# Patient Record
Sex: FEMALE | Race: WHITE | NOT HISPANIC OR LATINO | Employment: FULL TIME | URBAN - NONMETROPOLITAN AREA
[De-identification: names, ages, dates, MRNs, and addresses within clinical notes are randomized per-mention and may not be internally consistent; named-entity substitution may affect disease eponyms.]

---

## 2017-01-06 DIAGNOSIS — R53.81 MALAISE: ICD-10-CM

## 2017-01-06 DIAGNOSIS — R53.83 FATIGUE, UNSPECIFIED TYPE: ICD-10-CM

## 2017-01-06 DIAGNOSIS — E78.5 HYPERLIPIDEMIA, UNSPECIFIED HYPERLIPIDEMIA TYPE: Primary | ICD-10-CM

## 2017-01-25 ENCOUNTER — OFFICE VISIT (OUTPATIENT)
Dept: FAMILY MEDICINE CLINIC | Facility: CLINIC | Age: 57
End: 2017-01-25

## 2017-01-25 VITALS
HEART RATE: 62 BPM | DIASTOLIC BLOOD PRESSURE: 84 MMHG | BODY MASS INDEX: 32.44 KG/M2 | WEIGHT: 190 LBS | SYSTOLIC BLOOD PRESSURE: 124 MMHG | HEIGHT: 64 IN

## 2017-01-25 DIAGNOSIS — G43.909 MIGRAINE WITHOUT STATUS MIGRAINOSUS, NOT INTRACTABLE, UNSPECIFIED MIGRAINE TYPE: ICD-10-CM

## 2017-01-25 DIAGNOSIS — R00.2 PALPITATIONS: ICD-10-CM

## 2017-01-25 DIAGNOSIS — K21.9 GASTROESOPHAGEAL REFLUX DISEASE, ESOPHAGITIS PRESENCE NOT SPECIFIED: ICD-10-CM

## 2017-01-25 DIAGNOSIS — R55 POSTURAL DIZZINESS WITH PRESYNCOPE: Primary | ICD-10-CM

## 2017-01-25 DIAGNOSIS — R42 POSTURAL DIZZINESS WITH PRESYNCOPE: Primary | ICD-10-CM

## 2017-01-25 DIAGNOSIS — E55.9 VITAMIN D DEFICIENCY: ICD-10-CM

## 2017-01-25 DIAGNOSIS — E78.5 HYPERLIPIDEMIA, UNSPECIFIED HYPERLIPIDEMIA TYPE: ICD-10-CM

## 2017-01-25 PROCEDURE — 99214 OFFICE O/P EST MOD 30 MIN: CPT | Performed by: INTERNAL MEDICINE

## 2017-01-25 RX ORDER — SUMATRIPTAN 100 MG/1
TABLET, FILM COATED ORAL
Qty: 27 TABLET | Refills: 3 | Status: SHIPPED | OUTPATIENT
Start: 2017-01-25 | End: 2017-02-09 | Stop reason: SDUPTHER

## 2017-01-25 RX ORDER — OMEPRAZOLE 20 MG/1
20 CAPSULE, DELAYED RELEASE ORAL DAILY
Qty: 90 CAPSULE | Refills: 3 | Status: SHIPPED | OUTPATIENT
Start: 2017-01-25 | End: 2017-01-27 | Stop reason: SDUPTHER

## 2017-01-25 NOTE — MR AVS SNAPSHOT
Alexa Luna   1/25/2017 3:00 PM   Office Visit    Dept Phone:  [phone]   Encounter #:  [id number]    Provider:  Scot Abbasi MD   Department:  [facility]                Your Full Care Plan              Today's Medication Changes          These changes are accurate as of: 1/25/17  4:05 PM.  If you have any questions, ask your nurse or doctor.               Medication(s)that have changed:     SUMAtriptan 100 MG tablet   Commonly known as:  IMITREX   1/2-1 tablet by mouth. may repeat after 1 to 2 hours if needed maximum daily dose of 2 OR 3 TABLETS PER WEEK   What changed:  additional instructions   Changed by:  Scot Abbasi MD            Where to Get Your Medications      These medications were sent to [address] - [phone]  - [phone] [address] [address]     Phone:  [phone]     metoprolol tartrate 25 MG tablet    omeprazole 20 MG capsule    SUMAtriptan 100 MG tablet                  Your Updated Medication List          This list is accurate as of: 1/25/17  4:05 PM.  Always use your most recent med list.                estradiol 0.05 MG/24HR patch   Commonly known as:  PETE DICKEY       fluticasone 50 MCG/ACT nasal spray   Commonly known as:  FLONASE   1 spray into each nostril 2 (Two) Times a Day. Administer 1 spray in each nostril twice daily.       HYDROcodone-acetaminophen 7.5-325 MG per tablet   Commonly known as:  NORCO       meloxicam 15 MG tablet   Commonly known as:  MOBIC       metoprolol tartrate 25 MG tablet   Commonly known as:  LOPRESSOR   take 1 1/2 tablet by mouth twice a day       omeprazole 20 MG capsule   Commonly known as:  priLOSEC   Take 1 capsule by mouth Daily.       phenylephrine 10 MG tablet   Commonly known as:  SUDAFED PE   1 po 2-3 times daily prn congestion       SUMAtriptan 100 MG tablet   Commonly known  as:  IMITREX   1/2-1 tablet by mouth. may repeat after 1 to 2 hours if needed maximum daily dose of 2 OR 3 TABLETS PER WEEK       tiZANidine 4 MG tablet   Commonly known as:  ZANAFLEX       triamcinolone 0.1 % cream   Commonly known as:  KENALOG   Apply  topically 3 (Three) Times a Day. For itching       zolpidem 10 MG tablet   Commonly known as:  AMBIEN   Take 1 tablet by mouth At Night As Needed for sleep. 1/2 to 1 by mouth every night as needed for sleep.               We Performed the Following     Duplex Carotid Ultrasound CAR       You Were Diagnosed With        Codes Comments    Postural dizziness with presyncope    -  Primary ICD-10-CM: R42, R55  ICD-9-CM: 780.4, 780.2       Instructions     None    Patient Instructions History      Upcoming Appointments     Visit Type Date Time Department    OFFICE VISIT 2017  3:00 PM [facility]      [employer] Signup     [facility] [employer] allows you to send messages to your doctor, view your test results, renew your prescriptions, schedule appointments, and more. To sign up, go to [employer] and click on the Sign Up Now link in the New User? box. Enter your [employer] Activation Code exactly as it appears below along with the last four digits of your Social Security Number and your Date of Birth () to complete the sign-up process. If you do not sign up before the expiration date, you must request a new code.    [employer] Activation Code: [email or web address]  Expires: 2017  4:04 PM    If you have questions, you can email [email or web address] or call [phone] to talk to our [employer] staff. Remember, [employer] is NOT to be used for urgent needs. For medical emergencies, dial 911.               Other Info from Your Visit           Your Appointments     2018  3:30 PM CST   Office Visit with Scot Abbasi MD   [facility] [place] (--)    [address] [place] [address] [address] [place] [address]   [phone]            "Arrive 15 minutes prior to appointment.              Allergies     [place]  Swelling    Of face      Reason for Visit     Follow-up 12 month    Results lab      Vital Signs     Blood Pressure Pulse Height Weight Body Mass Index Smoking Status    124/84 62 64\" (162.6 cm) 190 lb (86.2 kg) 32.61 kg/m2 Former Smoker      Problems and Diagnoses Noted     Postural dizziness with presyncope    -  Primary        "

## 2017-01-25 NOTE — PROGRESS NOTES
"Subjective      History of Present Illness     Alexa Luna is a 56 y.o. female for annual follow up on hyperlipidemia, GERD, impaired fasting glucose, eustachian tube dysfunction, and ELVA among other issues.  She reports her last repeat colonoscopy was completed at the [facility] 10/2016 with no polyps found.  Repeat recommended in ten years, making her due fall of 2026.      She reports intermittent episodes of dizziness/presyncopal episodes occurring about once weekly.  She reports the episodes normally resolve spontaneously after about five minutes.  She denies any chest pain or pressure with the dizziness.  She denies a strong family history of stroke or CAD.   She denies any syncopal episodes, but describes dizziness consistent with presyncope.  She reports an episode a couple of months ago when she was walking across the lobby at work, when she actually had to grab a chair for support.  There is no pattern to timing with the episodes occurring both day and night.  We discussed options including a cardiac workup with a Holter monitor and/or a referral to neurology.  She has a history of atypical migraine headaches, for which she has taken Topamax, but was intolerant due to vision changes.  She was seen by optomology last week for partial vision loss and was referred to Dr. Aj, ophthalmologist, for the vision deficit issues.        GERD symptoms adequately controlled with Prilosec.       She reports intermittent pain to the plantar surface of her right foot.  I recommended wearing supportive shoes with cushioned arch support.      Blood pressure 124/84, pulse 62, height 64\" (162.6 cm), weight 190 lb (86.2 kg).  Weight is down 8 pounds in the past year.      I recommended influenza vaccine.  She declines today.      Vitamin D level has drifted down to 27.6.  She reports very little dietary calcium.  I recommended OTC Caltrate one daily and moderate sunlight exposure.       The patient's " relevant past medical, surgical, and social history was reviewed in Epic.   Lab results are reviewed with the patient today. CBC and CMP unremarkable.  Total cholesterol 201.  .  Thyroid level at goal.  Liver and renal function normal.      Review of Systems   Constitutional: Negative for chills, fatigue and fever.   HENT: Negative for congestion, ear pain, postnasal drip, sinus pressure and sore throat.    Respiratory: Negative for cough, shortness of breath and wheezing.    Cardiovascular: Negative for chest pain, palpitations and leg swelling.   Gastrointestinal: Negative for abdominal pain, blood in stool, constipation, diarrhea, nausea and vomiting.   Endocrine: Negative for cold intolerance, heat intolerance, polydipsia and polyuria.   Genitourinary: Negative for dysuria, frequency, hematuria and urgency.   Skin: Negative for rash.   Neurological: Negative for syncope and weakness.        Objective     Physical Exam   Constitutional: She is oriented to person, place, and time. She appears well-developed and well-nourished. No distress.   Obese female   HENT:   Head: Normocephalic and atraumatic.   Nose: Right sinus exhibits no maxillary sinus tenderness and no frontal sinus tenderness. Left sinus exhibits no maxillary sinus tenderness and no frontal sinus tenderness.   Mouth/Throat: Uvula is midline, oropharynx is clear and moist and mucous membranes are normal. No oral lesions. No tonsillar exudate.   Eyes: Conjunctivae and EOM are normal. Pupils are equal, round, and reactive to light.   Neck: Trachea normal. Neck supple. No JVD present. Carotid bruit is not present. No tracheal deviation present. No thyroid mass and no thyromegaly present.   Cardiovascular: Normal rate, regular rhythm and normal heart sounds.   No extrasystoles are present. PMI is not displaced.    No murmur heard.  Pulmonary/Chest: Effort normal and breath sounds normal. No accessory muscle usage. No respiratory distress. She has no  decreased breath sounds. She has no wheezes. She has no rhonchi. She has no rales.   Abdominal: Soft. Bowel sounds are normal. She exhibits no distension. There is no hepatosplenomegaly. There is no tenderness.   Overweight abdomen limits exam.         Vascular Status -  Her exam exhibits right foot vasculature normal. Her exam exhibits no right foot edema. Her exam exhibits left foot vasculature normal. Her exam exhibits no left foot edema.  Lymphadenopathy:     She has no cervical adenopathy.   Neurological: She is alert and oriented to person, place, and time. No cranial nerve deficit. Coordination normal.   Skin: Skin is warm, dry and intact. No rash noted. No cyanosis. Nails show no clubbing.   Psychiatric: She has a normal mood and affect. Her speech is normal and behavior is normal. Thought content normal.   Vitals reviewed.       Assessment/Plan      Schedule Holter monitor and carotid Doppler ultrasound for the presyncopal episodes.     She declines influenza vaccine today.     I recommended wearing supportive shoes with a cushioned arch for the right plantar foot pain.    Continue with current prescription medications.  Start OTC Caltrate one daily.         Scribed for Dr. Abbasi by Kathryn Sweeney [facility].     Diagnoses and all orders for this visit:    Postural dizziness with presyncope  -     Holter Monitor - 48 Hour; Future  -     Duplex Carotid Ultrasound CAR    Hyperlipidemia, unspecified hyperlipidemia type    Migraine without status migrainosus, not intractable, unspecified migraine type    Gastroesophageal reflux disease, esophagitis presence not specified    Other orders  -     metoprolol tartrate (LOPRESSOR) 25 MG tablet; take 1 1/2 tablet by mouth twice a day  -     omeprazole (priLOSEC) 20 MG capsule; Take 1 capsule by mouth Daily.  -     SUMAtriptan (IMITREX) 100 MG tablet; 1/2-1 tablet by mouth. may repeat after 1 to 2 hours if needed maximum daily dose of 2 OR 3 TABLETS PER  WEEK      Hospital Outpatient Visit on 01/11/2017   Component Date Value Ref Range Status   • 25 Hydroxy, Vitamin D 01/11/2017 27.6* 30.0 - 100.0 ng/ml Final    Comment: INTERPRETIVE INFORMATION:  Deficient...................<20 ng/ml  Insufficient..........20-<30 ng/ml  Sufficient.............. ng/ml  Potiential Toxicity.....100 ng/ml       • TSH 01/11/2017 2.30  0.46 - 4.68 uIU/ml Final   Hospital Outpatient Visit on 01/11/2017   Component Date Value Ref Range Status   • WBC 01/11/2017 6.7  3.2 - 9.8 x1000/uL Final   • RBC 01/11/2017 4.76  3.77 - 5.16 moe/mm3 Final   • Hemoglobin 01/11/2017 14.1  12.0 - 15.5 gm/dl Final   • Hematocrit 01/11/2017 41.9  35.0 - 45.0 % Final   • MCV 01/11/2017 88.0  80.0 - 98.0 fl Final   • MCH 01/11/2017 29.6  26.0 - 34.0 pg Final   • MCHC 01/11/2017 33.7  31.4 - 36.0 gm/dl Final   • Platelets 01/11/2017 269  150 - 450 x1000/mm3 Final   • RDW 01/11/2017 14.0  11.5 - 14.5 % Final   • MPV 01/11/2017 10.6  8.0 - 12.0 fl Final   • Neutrophil Rel % 01/11/2017 65.5  37.0 - 80.0 % Final   • Lymphocyte Rel % 01/11/2017 24.7  10.0 - 50.0 % Final   • Monocyte Rel % 01/11/2017 6.4  0.0 - 12.0 % Final   • Eosinophil Rel % 01/11/2017 3.0  0.0 - 7.0 % Final   • Basophil Rel % 01/11/2017 0.4  0.0 - 2.0 % Final   • nRBC 01/11/2017 0   Final   • nRBC 01/11/2017 0   Final   • Glucose 01/11/2017 98  70.0 - 100.0 mg/dl Final   • BUN 01/11/2017 10  8.0 - 25.0 mg/dl Final   • Creatinine 01/11/2017 0.8  0.4 - 1.3 mg/dl Final   • Sodium 01/11/2017 141.0  134 - 146 mmol/L Final   • Potassium 01/11/2017 4.2  3.4 - 5.4 mmol/L Final   • Chloride 01/11/2017 105.0  100.0 - 112.0 mmol/L Final   • CO2 01/11/2017 30.0  20.0 - 32.0 mmol/L Final   • Calcium 01/11/2017 9.4  8.4 - 10.8 mg/dl Final   • Total Protein 01/11/2017 6.8  6.7 - 8.2 gm/dl Final   • Albumin 01/11/2017 3.8  3.2 - 5.5 gm/dl Final   • Total Bilirubin 01/11/2017 0.8  0.2 - 1.0 mg/dl Final   • Alkaline Phosphatase 01/11/2017 35  15 - 121 U/L  Final   • ALT (SGPT) 01/11/2017 15  10 - 60 U/L Final   • AST (SGOT) 01/11/2017 15  10 - 60 U/L Final   • GFR MDRD Non  01/11/2017 74  51 - 120 mL/min/1.73 sq.M Final    Comment: Invalid if creatinine is changing or the patient is on dialysis. Use AA  result if patient is -American, non AA result otherwise.     • GFR MDRD  01/11/2017 89  51 - 120 mL/min/1.73 sq.M Final   • Anion Gap 01/11/2017 6.0  5.0 - 15.0 mmol/L Final   • Total Cholesterol 01/11/2017 201* 150 - 200 mg/dl Final    CHOL AVERAGE RISK: 200 - 239 MG/DL   • Triglycerides 01/11/2017 111  35 - 160 mg/dl Final    TRIG DESIRED: < 200 MG/DL   • HDL Cholesterol 01/11/2017 42.2  35.0 - 100.0 mg/dl Final    HDL AVERAGE RISK: 35 - 60 MG/DL   • LDL Cholesterol  01/11/2017 137  mg/dl Final    LDL AVERAGE RISK: 130 - 159 MG/DL   ]

## 2017-01-27 RX ORDER — OMEPRAZOLE 20 MG/1
CAPSULE, DELAYED RELEASE ORAL
Qty: 30 CAPSULE | Refills: 11 | Status: SHIPPED | OUTPATIENT
Start: 2017-01-27 | End: 2017-02-09 | Stop reason: SDUPTHER

## 2017-01-31 DIAGNOSIS — R42 DIZZINESS AND GIDDINESS: Primary | ICD-10-CM

## 2017-01-31 DIAGNOSIS — R55 SYNCOPE AND COLLAPSE: ICD-10-CM

## 2017-02-09 RX ORDER — SUMATRIPTAN 100 MG/1
TABLET, FILM COATED ORAL
Qty: 27 TABLET | Refills: 3 | Status: SHIPPED | OUTPATIENT
Start: 2017-02-09 | End: 2018-02-13 | Stop reason: SDUPTHER

## 2017-02-09 RX ORDER — ZOLPIDEM TARTRATE 10 MG/1
10 TABLET ORAL NIGHTLY PRN
Qty: 90 TABLET | Refills: 1 | Status: SHIPPED | OUTPATIENT
Start: 2017-02-09 | End: 2018-02-13 | Stop reason: SDUPTHER

## 2017-02-09 RX ORDER — OMEPRAZOLE 20 MG/1
CAPSULE, DELAYED RELEASE ORAL
Qty: 90 CAPSULE | Refills: 3 | Status: SHIPPED | OUTPATIENT
Start: 2017-02-09 | End: 2017-12-31 | Stop reason: SDUPTHER

## 2017-03-03 DIAGNOSIS — R55 SYNCOPE, UNSPECIFIED SYNCOPE TYPE: Primary | ICD-10-CM

## 2017-03-09 ENCOUNTER — OFFICE VISIT (OUTPATIENT)
Dept: FAMILY MEDICINE CLINIC | Facility: CLINIC | Age: 57
End: 2017-03-09

## 2017-03-09 VITALS
WEIGHT: 190.6 LBS | SYSTOLIC BLOOD PRESSURE: 122 MMHG | TEMPERATURE: 96.8 F | DIASTOLIC BLOOD PRESSURE: 80 MMHG | BODY MASS INDEX: 32.54 KG/M2 | HEIGHT: 64 IN | HEART RATE: 80 BPM

## 2017-03-09 DIAGNOSIS — G43.909 MIGRAINE WITHOUT STATUS MIGRAINOSUS, NOT INTRACTABLE, UNSPECIFIED MIGRAINE TYPE: Primary | ICD-10-CM

## 2017-03-09 DIAGNOSIS — M62.838 MUSCLE SPASMS OF NECK: ICD-10-CM

## 2017-03-09 DIAGNOSIS — R55 SYNCOPE, UNSPECIFIED SYNCOPE TYPE: ICD-10-CM

## 2017-03-09 PROCEDURE — 99213 OFFICE O/P EST LOW 20 MIN: CPT | Performed by: INTERNAL MEDICINE

## 2017-03-09 RX ORDER — TOPIRAMATE 50 MG/1
TABLET, FILM COATED ORAL
Qty: 90 TABLET | Refills: 11 | Status: SHIPPED | OUTPATIENT
Start: 2017-03-09 | End: 2018-04-05 | Stop reason: SDUPTHER

## 2017-03-09 NOTE — PROGRESS NOTES
Subjective     Alexa Luna is a 56 y.o. female.     History of Present Illness   Alexa reports a significant increase in frequency and intensity of migraine headaches for the past 2 weeks.  Most of the headaches are bilateral and associated with light sensitivity/photophobia.  She reports only mild noise sensitivity.  She denies visual aura or any unilateral neurological symptoms.  She rates her current pain as a 4/10.  When the headache is most intense, she rates it a 10/10.  Some of the headaches seem to be triggered by posterior neck tension.  We demonstrated deep muscle massage therapy for the posterior neck and trapezius region.  She has been using more Imitrex than usual, although he frequently just uses one fourth tablet of Imitrex.  Imitrex usually helps for a while, but the headache will then return.  She denies any facial pain or purulent nasal discharge.  She denies TMJ symptoms.    Since her last visit, the patient had reported additional presyncopal symptoms when I saw her at the bank.  We have been in the process of arranging repeat evaluation by Dr. Dr. Dawkins.  She may need an event recorder.  Her events are relatively rare and have not been called on Holter monitor.    She reports that she tried Topamax several years ago that did not seem to tolerate it very well.  She is not sure what issues she had.  She denies any allergic reaction.  After discussing options, she would like to try starting with a low dose of Topamax and gradually titrating upward.      Review of Systems   Constitutional: Negative for chills, fatigue and fever.   HENT: Negative for congestion, ear pain, postnasal drip, sinus pressure and sore throat.    Respiratory: Negative for cough, shortness of breath and wheezing.    Cardiovascular: Negative for chest pain, palpitations and leg swelling.   Gastrointestinal: Negative for abdominal pain, blood in stool, constipation, diarrhea, nausea and vomiting.   Endocrine: Negative for  "cold intolerance, heat intolerance, polydipsia and polyuria.   Genitourinary: Negative for dysuria, frequency, hematuria and urgency.   Musculoskeletal: Positive for neck pain.   Skin: Negative for rash.   Neurological: Positive for headaches. Negative for syncope and weakness.       Objective     Visit Vitals   • /80   • Pulse 80   • Temp 96.8 °F (36 °C) (Oral)   • Ht 64\" (162.6 cm)   • Wt 190 lb 9.6 oz (86.5 kg)   • BMI 32.72 kg/m2       Physical Exam   Constitutional: She is oriented to person, place, and time. She appears well-developed and well-nourished. No distress.   Obese female   HENT:   Head: Normocephalic and atraumatic.   Nose: Right sinus exhibits no maxillary sinus tenderness and no frontal sinus tenderness. Left sinus exhibits no maxillary sinus tenderness and no frontal sinus tenderness.   Mouth/Throat: Uvula is midline, oropharynx is clear and moist and mucous membranes are normal. No oral lesions. No tonsillar exudate.   No sinus tenderness.  No nasal congestion.   Eyes: Conjunctivae and EOM are normal. Pupils are equal, round, and reactive to light.   Neck: Trachea normal. Neck supple. No JVD present. Carotid bruit is not present. No tracheal deviation present. No thyroid mass and no thyromegaly present.   Cardiovascular: Normal rate, regular rhythm and normal heart sounds.   No extrasystoles are present. PMI is not displaced.    No murmur heard.  Pulmonary/Chest: Effort normal and breath sounds normal. No accessory muscle usage. No respiratory distress. She has no decreased breath sounds. She has no wheezes. She has no rhonchi. She has no rales.   Abdominal: There is no hepatosplenomegaly.   Overweight abdomen limits exam.     Musculoskeletal:   Exam of the posterior neck reveals normal range of motion, but increased paraspinal and trapezius muscle spasm, left more than right.       Vascular Status -  Her exam exhibits right foot vasculature normal. Her exam exhibits no right foot edema. " Her exam exhibits left foot vasculature normal. Her exam exhibits no left foot edema.  Lymphadenopathy:     She has no cervical adenopathy.   Neurological: She is alert and oriented to person, place, and time. No cranial nerve deficit. Coordination normal.   Skin: Skin is warm, dry and intact. No rash noted. No cyanosis. Nails show no clubbing.   Psychiatric: She has a normal mood and affect. Her speech is normal and behavior is normal. Thought content normal.   Vitals reviewed.      Assessment/Plan   Continue to use the Imitrex as needed, but she probably needs to increase the dose of bit, and she is currently normally only using one fourth tablet.  She voices understanding.  Start Topamax at 25 mg daily at bedtime and increase by 25 mg every 6 days until she expresses adequate results.  Discussed normal side effects and she will watch closely for any other side effects.  We discussed how she may be able to increase and decrease her evening Topamax at times based upon frequency and intensity of headaches.    I demonstrated deep massage techniques to help with the muscle spasm component of the posterior neck.  She may use Mobic as needed.  Avoid chronic or frequent NSAID use due to the possibility of producing rebound headaches.    We will once again contact Dr. Dr. Dawkins's office today to make sure this patient has an appointment soon to evaluate her presyncopal episodes described previously.      Diagnoses and all orders for this visit:    Migraine without status migrainosus, not intractable, unspecified migraine type    Muscle spasms of neck    Syncope, unspecified syncope type    Other orders  -     topiramate (TOPAMAX) 50 MG tablet; Gradually work up to 3 tablets at night for migraines        No visits with results within 3 Week(s) from this visit.  Latest known visit with results is:    Hospital Outpatient Visit on 01/11/2017   Component Date Value Ref Range Status   • 25 Hydroxy, Vitamin D 01/11/2017 27.6*  30.0 - 100.0 ng/ml Final    Comment: INTERPRETIVE INFORMATION:  Deficient...................<20 ng/ml  Insufficient..........20-<30 ng/ml  Sufficient.............. ng/ml  Potiential Toxicity.....100 ng/ml       • TSH 01/11/2017 2.30  0.46 - 4.68 uIU/ml Final   ]

## 2017-03-17 ENCOUNTER — OFFICE VISIT (OUTPATIENT)
Dept: FAMILY MEDICINE CLINIC | Facility: CLINIC | Age: 57
End: 2017-03-17

## 2017-03-17 VITALS
OXYGEN SATURATION: 98 % | SYSTOLIC BLOOD PRESSURE: 106 MMHG | BODY MASS INDEX: 32.44 KG/M2 | WEIGHT: 190 LBS | TEMPERATURE: 96.9 F | DIASTOLIC BLOOD PRESSURE: 60 MMHG | HEART RATE: 91 BPM | HEIGHT: 64 IN

## 2017-03-17 DIAGNOSIS — J20.9 ACUTE BRONCHITIS, UNSPECIFIED ORGANISM: Primary | ICD-10-CM

## 2017-03-17 DIAGNOSIS — R55 POSTURAL DIZZINESS WITH PRESYNCOPE: ICD-10-CM

## 2017-03-17 DIAGNOSIS — J06.9 ACUTE URI: ICD-10-CM

## 2017-03-17 DIAGNOSIS — R42 POSTURAL DIZZINESS WITH PRESYNCOPE: ICD-10-CM

## 2017-03-17 PROCEDURE — 96372 THER/PROPH/DIAG INJ SC/IM: CPT | Performed by: INTERNAL MEDICINE

## 2017-03-17 PROCEDURE — 99213 OFFICE O/P EST LOW 20 MIN: CPT | Performed by: INTERNAL MEDICINE

## 2017-03-17 RX ORDER — AZITHROMYCIN 250 MG/1
TABLET, FILM COATED ORAL
Qty: 6 TABLET | Refills: 0 | Status: SHIPPED | OUTPATIENT
Start: 2017-03-17 | End: 2017-04-11

## 2017-03-17 RX ORDER — TRIAMCINOLONE ACETONIDE 40 MG/ML
10 INJECTION, SUSPENSION INTRA-ARTICULAR; INTRAMUSCULAR ONCE
Status: COMPLETED | OUTPATIENT
Start: 2017-03-17 | End: 2017-03-17

## 2017-03-17 RX ORDER — METHYLPREDNISOLONE ACETATE 80 MG/ML
80 INJECTION, SUSPENSION INTRA-ARTICULAR; INTRALESIONAL; INTRAMUSCULAR; SOFT TISSUE ONCE
Status: COMPLETED | OUTPATIENT
Start: 2017-03-17 | End: 2017-03-17

## 2017-03-17 RX ADMIN — METHYLPREDNISOLONE ACETATE 80 MG: 80 INJECTION, SUSPENSION INTRA-ARTICULAR; INTRALESIONAL; INTRAMUSCULAR; SOFT TISSUE at 16:29

## 2017-03-17 RX ADMIN — TRIAMCINOLONE ACETONIDE 10 MG: 40 INJECTION, SUSPENSION INTRA-ARTICULAR; INTRAMUSCULAR at 16:29

## 2017-03-17 NOTE — PROGRESS NOTES
"Subjective        History of Present Illness     Alexa Luna is a 56 y.o. female reporting onset of upper respiratory symptoms a few days ago including nasal congestion with somewhat thickened green and blood tinged drainage with postnasal drainage.  She reports a scratchy throat and ear pressure as well as increased fatigue.  She reports a frequent nonproductive cough with some minimal shortness of breath.  She has no sick contacts that she is aware of.  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  She has been taking Vicks Sinex b.i.d., which helps relieve symptoms so she can sleep.  She was seen at [facility] and treated with Flonase and Zyrtec.  Exam today reveals bilateral wheezing.  She has not used an inhaler in the past.  Sample of Dulera is given today with appropriate use demonstrated.      Due to the patient recently notifying me that she has continued to experience some fairly impressive presyncopal episodes, I have referred her to cardiology.  She may need an event recorder in order to catch one of these episodes.  Holter monitor was unremarkable.     Review of Systems   Constitutional: Positive for fatigue. Negative for chills and fever.   HENT: Positive for congestion, postnasal drip, sinus pressure and sore throat. Negative for ear pain.    Respiratory: Positive for cough, shortness of breath and wheezing.    Cardiovascular: Negative for chest pain, palpitations and leg swelling.   Gastrointestinal: Negative for abdominal pain, blood in stool, constipation, diarrhea, nausea and vomiting.   Endocrine: Negative for cold intolerance, heat intolerance, polydipsia and polyuria.   Genitourinary: Negative for dysuria, frequency, hematuria and urgency.   Skin: Negative for rash.   Neurological: Negative for syncope and weakness.        Objective     Visit Vitals   • /60   • Pulse 91   • Temp 96.9 °F (36.1 °C) (Oral)   • Ht 64\" (162.6 cm)   • Wt 190 lb (86.2 kg)   • SpO2 98%   • " BMI 32.61 kg/m2       Physical Exam   Constitutional: She is oriented to person, place, and time. She appears well-developed and well-nourished. No distress.   HENT:   Head: Normocephalic and atraumatic.   Nose: Right sinus exhibits maxillary sinus tenderness. Left sinus exhibits maxillary sinus tenderness.   Mouth/Throat: Oropharynx is clear and moist. No oropharyngeal exudate.   Nasal congestion and lots of clear postnasal drip.  Maxillary sinus tenderness bilaterally.     Eyes: EOM are normal. Pupils are equal, round, and reactive to light.   Neck: Neck supple. No JVD present. No thyromegaly present.   Cardiovascular: Normal rate, regular rhythm and normal heart sounds.    Pulmonary/Chest: Effort normal. No accessory muscle usage. No respiratory distress. She has wheezes. She has no rales.   Bilateral wheezing and diminished excursion on expiratory phase of cough.      Abdominal: Soft. Bowel sounds are normal. She exhibits no distension. There is no tenderness.   Musculoskeletal: She exhibits no edema.   Lymphadenopathy:     She has no cervical adenopathy.   Neurological: She is alert and oriented to person, place, and time. No cranial nerve deficit.   Psychiatric: She has a normal mood and affect. Her speech is normal and behavior is normal.            PHQ-9 Depression Screening 3/9/2017   Little interest or pleasure in doing things 1   Feeling down, depressed, or hopeless 0   Trouble falling or staying asleep, or sleeping too much 1   Feeling tired or having little energy 1   Poor appetite or overeating 1   Feeling bad about yourself - or that you are a failure or have let yourself or your family down 1   Trouble concentrating on things, such as reading the newspaper or watching television 0   Moving or speaking so slowly that other people could have noticed. Or the opposite - being so fidgety or restless that you have been moving around a lot more than usual 0   Thoughts that you would be better off dead, or  of hurting yourself in some way 0   PHQ-9 Total Score 5   If you checked off any problems, how difficult have these problems made it for you to do your work, take care of things at home, or get along with other people? Not difficult at all     Future Appointments  Date Time Provider Department Center   4/11/2017 3:15 PM Timothy Trivedi MD [facility] None   1/29/2018 3:30 PM Scot Abbasi MD [facility] None     Assessment/Plan      Recommended OTC Delsym cough syrup to use twice daily.   Z-pack as directed.         After informed verbal consent, patient is given Kenalog 10 mg and Depo-Medrol 80 mg IM without difficulty or complications.  She tolerated well.      She is given a sample of Dulera to use two puffs b.i.d.  Proper use is demonstrated today.      Scribed for Dr. Abbasi by Kathryn Sweeney [facility].     Diagnoses and all orders for this visit:    Acute bronchitis, unspecified organism  -     triamcinolone acetonide (KENALOG-40) injection 10 mg; Inject 0.25 mL into the shoulder, thigh, or buttocks 1 (One) Time.  -     methylPREDNISolone acetate (DEPO-medrol) injection 80 mg; Inject 1 mL into the shoulder, thigh, or buttocks 1 (One) Time.    Acute URI    Postural dizziness with presyncope    Other orders  -     azithromycin (ZITHROMAX) 250 MG tablet; Take 2 tablets the first day, then 1 tablet daily for 4 days.        No visits with results within 3 Week(s) from this visit.  Latest known visit with results is:    Hospital Outpatient Visit on 01/11/2017   Component Date Value Ref Range Status   • 25 Hydroxy, Vitamin D 01/11/2017 27.6* 30.0 - 100.0 ng/ml Final    Comment: INTERPRETIVE INFORMATION:  Deficient...................<20 ng/ml  Insufficient..........20-<30 ng/ml  Sufficient.............. ng/ml  Potiential Toxicity.....100 ng/ml       • TSH 01/11/2017 2.30  0.46 - 4.68 uIU/ml Final   ]

## 2017-04-11 ENCOUNTER — OFFICE VISIT (OUTPATIENT)
Dept: CARDIOLOGY | Facility: CLINIC | Age: 57
End: 2017-04-11

## 2017-04-11 VITALS
SYSTOLIC BLOOD PRESSURE: 98 MMHG | HEART RATE: 63 BPM | WEIGHT: 181.3 LBS | OXYGEN SATURATION: 98 % | BODY MASS INDEX: 30.95 KG/M2 | DIASTOLIC BLOOD PRESSURE: 68 MMHG | RESPIRATION RATE: 16 BRPM | HEIGHT: 64 IN

## 2017-04-11 DIAGNOSIS — R55 POSTURAL DIZZINESS WITH PRESYNCOPE: Primary | ICD-10-CM

## 2017-04-11 DIAGNOSIS — R42 POSTURAL DIZZINESS WITH PRESYNCOPE: Primary | ICD-10-CM

## 2017-04-11 PROCEDURE — 93010 ELECTROCARDIOGRAM REPORT: CPT | Performed by: INTERNAL MEDICINE

## 2017-04-11 PROCEDURE — 99203 OFFICE O/P NEW LOW 30 MIN: CPT | Performed by: INTERNAL MEDICINE

## 2017-04-11 RX ORDER — LORATADINE 10 MG/1
10 CAPSULE, LIQUID FILLED ORAL DAILY
COMMUNITY
End: 2018-02-13 | Stop reason: ALTCHOICE

## 2017-05-01 LAB
BH CV ECHO MEAS - % IVS THICK: 0 %
BH CV ECHO MEAS - % LVPW THICK: 23.5 %
BH CV ECHO MEAS - ACS: 1.8 CM
BH CV ECHO MEAS - AI DEC SLOPE: 102.9 CM/SEC^2
BH CV ECHO MEAS - AI MAX PG: 21 MMHG
BH CV ECHO MEAS - AI MAX VEL: 207 CM/SEC
BH CV ECHO MEAS - AI P1/2T: 589.2 MSEC
BH CV ECHO MEAS - AO MAX PG (FULL): 2.7 MMHG
BH CV ECHO MEAS - AO MAX PG: 6.9 MMHG
BH CV ECHO MEAS - AO MEAN PG (FULL): 2 MMHG
BH CV ECHO MEAS - AO MEAN PG: 4 MMHG
BH CV ECHO MEAS - AO ROOT AREA (BSA CORRECTED): 1.4
BH CV ECHO MEAS - AO ROOT AREA: 5.3 CM^2
BH CV ECHO MEAS - AO ROOT DIAM: 2.6 CM
BH CV ECHO MEAS - AO V2 MAX: 131 CM/SEC
BH CV ECHO MEAS - AO V2 MEAN: 88.2 CM/SEC
BH CV ECHO MEAS - AO V2 VTI: 27 CM
BH CV ECHO MEAS - AVA(I,A): 2.1 CM^2
BH CV ECHO MEAS - AVA(I,D): 2.1 CM^2
BH CV ECHO MEAS - AVA(V,A): 2.4 CM^2
BH CV ECHO MEAS - AVA(V,D): 2.4 CM^2
BH CV ECHO MEAS - BSA(HAYCOCK): 2 M^2
BH CV ECHO MEAS - BSA: 1.9 M^2
BH CV ECHO MEAS - BZI_BMI: 31.1 KILOGRAMS/M^2
BH CV ECHO MEAS - BZI_METRIC_HEIGHT: 162.6 CM
BH CV ECHO MEAS - BZI_METRIC_WEIGHT: 82.1 KG
BH CV ECHO MEAS - CONTRAST EF 4CH: 55.9 ML/M^2
BH CV ECHO MEAS - EDV(CUBED): 172.8 ML
BH CV ECHO MEAS - EDV(MOD-SP4): 103 ML
BH CV ECHO MEAS - EDV(TEICH): 151.8 ML
BH CV ECHO MEAS - EF(CUBED): 72.8 %
BH CV ECHO MEAS - EF(MOD-SP4): 55.9 %
BH CV ECHO MEAS - EF(TEICH): 63.9 %
BH CV ECHO MEAS - ESV(CUBED): 47 ML
BH CV ECHO MEAS - ESV(MOD-SP4): 45.4 ML
BH CV ECHO MEAS - ESV(TEICH): 54.8 ML
BH CV ECHO MEAS - FS: 35.2 %
BH CV ECHO MEAS - IVS/LVPW: 0.83
BH CV ECHO MEAS - IVSD: 0.96 CM
BH CV ECHO MEAS - IVSS: 0.96 CM
BH CV ECHO MEAS - LA DIMENSION: 3.9 CM
BH CV ECHO MEAS - LA/AO: 1.5
BH CV ECHO MEAS - LV DIASTOLIC VOL/BSA (35-75): 54.9 ML/M^2
BH CV ECHO MEAS - LV MASS(C)D: 233.7 GRAMS
BH CV ECHO MEAS - LV MASS(C)DI: 124.7 GRAMS/M^2
BH CV ECHO MEAS - LV MASS(C)S: 140.3 GRAMS
BH CV ECHO MEAS - LV MASS(C)SI: 74.8 GRAMS/M^2
BH CV ECHO MEAS - LV MAX PG: 4.2 MMHG
BH CV ECHO MEAS - LV MEAN PG: 2 MMHG
BH CV ECHO MEAS - LV SYSTOLIC VOL/BSA (12-30): 24.2 ML/M^2
BH CV ECHO MEAS - LV V1 MAX: 102 CM/SEC
BH CV ECHO MEAS - LV V1 MEAN: 52.5 CM/SEC
BH CV ECHO MEAS - LV V1 VTI: 18.4 CM
BH CV ECHO MEAS - LVIDD: 5.6 CM
BH CV ECHO MEAS - LVIDS: 3.6 CM
BH CV ECHO MEAS - LVLD AP4: 6.5 CM
BH CV ECHO MEAS - LVLS AP4: 5.9 CM
BH CV ECHO MEAS - LVOT AREA (M): 3.1 CM^2
BH CV ECHO MEAS - LVOT AREA: 3.1 CM^2
BH CV ECHO MEAS - LVOT DIAM: 2 CM
BH CV ECHO MEAS - LVPWD: 1.2 CM
BH CV ECHO MEAS - LVPWS: 1.4 CM
BH CV ECHO MEAS - MR MAX PG: 23.4 MMHG
BH CV ECHO MEAS - MR MAX VEL: 242 CM/SEC
BH CV ECHO MEAS - MV A MAX VEL: 73.2 CM/SEC
BH CV ECHO MEAS - MV DEC SLOPE: 432 CM/SEC^2
BH CV ECHO MEAS - MV E MAX VEL: 74.5 CM/SEC
BH CV ECHO MEAS - MV E/A: 1
BH CV ECHO MEAS - PA MAX PG: 2.5 MMHG
BH CV ECHO MEAS - PA MEAN PG: 1 MMHG
BH CV ECHO MEAS - PA V2 MAX: 79.3 CM/SEC
BH CV ECHO MEAS - PA V2 MEAN: 54 CM/SEC
BH CV ECHO MEAS - PA V2 VTI: 14.4 CM
BH CV ECHO MEAS - PULM DIAS VEL: 37.1 CM/SEC
BH CV ECHO MEAS - PULM S/D: 1.3
BH CV ECHO MEAS - PULM SYS VEL: 49.5 CM/SEC
BH CV ECHO MEAS - RAP SYSTOLE: 10 MMHG
BH CV ECHO MEAS - RVSP: 23.4 MMHG
BH CV ECHO MEAS - SI(AO): 76.5 ML/M^2
BH CV ECHO MEAS - SI(CUBED): 67.1 ML/M^2
BH CV ECHO MEAS - SI(LVOT): 30.8 ML/M^2
BH CV ECHO MEAS - SI(MOD-SP4): 30.7 ML/M^2
BH CV ECHO MEAS - SI(TEICH): 51.7 ML/M^2
BH CV ECHO MEAS - SV(AO): 143.4 ML
BH CV ECHO MEAS - SV(CUBED): 125.8 ML
BH CV ECHO MEAS - SV(LVOT): 57.8 ML
BH CV ECHO MEAS - SV(MOD-SP4): 57.6 ML
BH CV ECHO MEAS - SV(TEICH): 97 ML
BH CV ECHO MEAS - TR MAX VEL: 182.5 CM/SEC

## 2017-06-07 ENCOUNTER — OFFICE VISIT (OUTPATIENT)
Dept: FAMILY MEDICINE CLINIC | Facility: CLINIC | Age: 57
End: 2017-06-07

## 2017-06-07 VITALS
TEMPERATURE: 97.8 F | BODY MASS INDEX: 29.81 KG/M2 | HEIGHT: 64 IN | SYSTOLIC BLOOD PRESSURE: 110 MMHG | HEART RATE: 85 BPM | WEIGHT: 174.6 LBS | OXYGEN SATURATION: 98 % | DIASTOLIC BLOOD PRESSURE: 70 MMHG

## 2017-06-07 DIAGNOSIS — J06.9 ACUTE URI: Primary | ICD-10-CM

## 2017-06-07 DIAGNOSIS — J20.9 ACUTE BRONCHITIS, UNSPECIFIED ORGANISM: ICD-10-CM

## 2017-06-07 DIAGNOSIS — L65.9 HAIR LOSS: ICD-10-CM

## 2017-06-07 DIAGNOSIS — M85.80 OSTEOPENIA: ICD-10-CM

## 2017-06-07 PROBLEM — E55.9 VITAMIN D DEFICIENCY: Chronic | Status: ACTIVE | Noted: 2017-01-25

## 2017-06-07 PROBLEM — R00.2 PALPITATIONS: Chronic | Status: ACTIVE | Noted: 2017-01-25

## 2017-06-07 PROCEDURE — 99213 OFFICE O/P EST LOW 20 MIN: CPT | Performed by: INTERNAL MEDICINE

## 2017-06-07 RX ORDER — ALENDRONATE SODIUM 70 MG/1
1 TABLET ORAL WEEKLY
Refills: 1 | COMMUNITY
Start: 2017-06-01 | End: 2019-06-28 | Stop reason: SDUPTHER

## 2017-06-07 RX ORDER — AZITHROMYCIN 250 MG/1
TABLET, FILM COATED ORAL
Qty: 6 TABLET | Refills: 0 | Status: SHIPPED | OUTPATIENT
Start: 2017-06-07 | End: 2017-07-05

## 2017-06-07 NOTE — PROGRESS NOTES
Subjective        History of Present Illness     Alexa Luna is a 56 y.o. female here with onset of frequent paroxysmal cough productive of green sputum, sore throat, and ear pain.  She denies wheezing or shortness of breath.  Exam reveals increased bronchial sounds.  She has a Dulera inhaler at home, but has not been using it.  She also reports chills.  She was seen at [facility]gent [facility] on 06/02/17 and treated for allergic rhinitis with Kenalog 40 IM.  No antibiotic was given.  Denies sick contacts other than contact with a baby with ear infection.   She also had symptoms of viral gastroenteritis about 10 days ago. She reports she had been taking Sinex and started Sudafed yesterday.       She is reporting no further episodes of presyncopal symptoms that she experienced earlier this year.  Workup was unremarkable.    She is reporting increased hair loss.  I recommended Biotin supplement.  I reviewed most recent labs.  Thyroid function was normal.      Her GYN at the [facility] repeated a DEXA revealing osteopenia and started her on Fosamax.  She is not taking a calcium or vitamin supplement.  I recommended she start oral calcium with vitamin D, such as Caltrate, daily.         Review of Systems   Constitutional: Negative for chills, fatigue and fever.   HENT: Negative for congestion, ear pain, postnasal drip, sinus pressure and sore throat.    Respiratory: Positive for cough. Negative for shortness of breath and wheezing.    Cardiovascular: Negative for chest pain, palpitations and leg swelling.   Gastrointestinal: Negative for abdominal pain, blood in stool, constipation, diarrhea, nausea and vomiting.   Endocrine: Negative for cold intolerance, heat intolerance, polydipsia and polyuria.   Genitourinary: Negative for dysuria, frequency, hematuria and urgency.   Skin: Negative for rash.   Neurological: Negative for syncope and weakness.      PHQ-9 Depression Screening 3/9/2017   Little interest or pleasure in  "doing things 1   Feeling down, depressed, or hopeless 0   Trouble falling or staying asleep, or sleeping too much 1   Feeling tired or having little energy 1   Poor appetite or overeating 1   Feeling bad about yourself - or that you are a failure or have let yourself or your family down 1   Trouble concentrating on things, such as reading the newspaper or watching television 0   Moving or speaking so slowly that other people could have noticed. Or the opposite - being so fidgety or restless that you have been moving around a lot more than usual 0   Thoughts that you would be better off dead, or of hurting yourself in some way 0   PHQ-9 Total Score 5   If you checked off any problems, how difficult have these problems made it for you to do your work, take care of things at home, or get along with other people? Not difficult at all         Objective     Vitals:    06/07/17 1329   BP: 110/70   Pulse: 85   Temp: 97.8 °F (36.6 °C)   TempSrc: Oral   SpO2: 98%   Weight: 174 lb 9.6 oz (79.2 kg)   Height: 64\" (162.6 cm)       Physical Exam   Constitutional: She is oriented to person, place, and time. She appears well-developed and well-nourished. No distress.   HENT:   Head: Normocephalic and atraumatic.   Nose: Nose normal.   Mouth/Throat: No oropharyngeal exudate.   Left TM is red and friable.  Mild erythema of the posterior oropharynx and prominent clear postnasal drip.  Nasal congestion.    Eyes: EOM are normal. Pupils are equal, round, and reactive to light.   Neck: Neck supple. No JVD present. No thyromegaly present.   Mild lymph node enlargement on the right.    Cardiovascular: Normal rate, regular rhythm and normal heart sounds.    Pulmonary/Chest: Effort normal and breath sounds normal. No accessory muscle usage. No respiratory distress. She has no wheezes. She has no rales.   Increased bronchial sounds.    Abdominal: Soft. Bowel sounds are normal. She exhibits no distension. There is no tenderness. "   Musculoskeletal: She exhibits no edema.   Lymphadenopathy:     She has cervical adenopathy.   Neurological: She is alert and oriented to person, place, and time. No cranial nerve deficit.   Psychiatric: She has a normal mood and affect. Her speech is normal and behavior is normal. Thought content normal.     Future Appointments  Date Time Provider Department Center   1/29/2018 3:30 PM Scot Abbasi MD [facility] None       Assessment/Plan       Z-pack as directed.  Continue with the Sudafed increasing to t.i.d.  Recommended Delsym cough syrup to use as directed.  She is given instructions to shake the Delsym well.  Saline nasal spray several times daily.  I recommended she use the Dulera inhaler she has at home about 1/2 hour before bed each night to help with the nighttime cough.       Recommended Caltrate one daily.      Scribed for Dr. Abbasi by Kathryn Sweeney [facility].     Diagnoses and all orders for this visit:    Acute URI    Acute bronchitis, unspecified organism    Hair loss    Osteopenia - gynecology started Fosamax for osteopenia, spring 2017    Other orders  -     alendronate (FOSAMAX) 70 MG tablet; Take 1 tablet by mouth 1 (One) Time Per Week.  -     azithromycin (ZITHROMAX) 250 MG tablet; Take 2 tablets the first day, then 1 tablet daily for 4 days.      No visits with results within 3 Week(s) from this visit.  Latest known visit with results is:    Hospital Outpatient Visit on 04/27/2017   Component Date Value Ref Range Status   • BH CV STRESS PROTOCOL 1 04/27/2017 Rajendra   Final   • Stage 1 04/27/2017 1   Final   • HR Stage 1 04/27/2017 102   Final   • BP Stage 1 04/27/2017 110 74   Final   • O2 Stage 1 04/27/2017 96   Final   • Duration Min Stage 1 04/27/2017 3   Final   • Duration Sec Stage 1 04/27/2017 0   Final   • Grade Stage 1 04/27/2017 10   Final   • Speed Stage 1 04/27/2017 1.7   Final   • BH CV STRESS METS STAGE 1 04/27/2017 5   Final   • Stage 2 04/27/2017 2   Final   • HR Stage 2  04/27/2017 123   Final   • BP Stage 2 04/27/2017 124 78   Final   • O2 Stage 2 04/27/2017 96   Final   • Duration Min Stage 2 04/27/2017 3   Final   • Duration Sec Stage 2 04/27/2017 0   Final   • Grade Stage 2 04/27/2017 12   Final   • Speed Stage 2 04/27/2017 2.5   Final   • BH CV STRESS METS STAGE 2 04/27/2017 7.5   Final   • Stage 3 04/27/2017 3   Final   • HR Stage 3 04/27/2017 125   Final   • BP Stage 3 04/27/2017 124 78   Final   • O2 Stage 3 04/27/2017 96   Final   • Duration Min Stage 3 04/27/2017 0   Final   • Duration Sec Stage 3 04/27/2017 18   Final   • Grade Stage 3 04/27/2017 14   Final   • Speed Stage 3 04/27/2017 3.4   Final   • BH CV STRESS METS STAGE 3 04/27/2017 10.0   Final   • Target HR (85%) 04/27/2017 138  bpm Final   • Max. Pred. HR (100%) 04/27/2017 162  bpm Final   • Baseline HR 04/27/2017 58  bpm Final   • Baseline BP 04/27/2017 98 68  mmHg Final   • O2 sat rest 04/27/2017 99  % Final   • Peak HR 04/27/2017 128  bpm Final   • Percent Max Pred HR 04/27/2017 79.01  % Final   • Percent Target HR 04/27/2017 93  % Final   • Peak BP 04/27/2017 136 78  mmHg Final   • O2 sat peak 04/27/2017 96  % Final   • Recovery HR 04/27/2017 73  bpm Final   • Recovery BP 04/27/2017 96 74  mmHg Final   • Recovery O2 04/27/2017 98  % Final   • Exercise duration (min) 04/27/2017 6  min Final   • Exercise duration (sec) 04/27/2017 18  sec Final   • Estimated workload 04/27/2017 7.0  METS Final   ]

## 2017-07-05 ENCOUNTER — OFFICE VISIT (OUTPATIENT)
Dept: FAMILY MEDICINE CLINIC | Facility: CLINIC | Age: 57
End: 2017-07-05

## 2017-07-05 VITALS
HEART RATE: 56 BPM | TEMPERATURE: 97.9 F | HEIGHT: 64 IN | DIASTOLIC BLOOD PRESSURE: 60 MMHG | WEIGHT: 174 LBS | BODY MASS INDEX: 29.71 KG/M2 | SYSTOLIC BLOOD PRESSURE: 110 MMHG

## 2017-07-05 DIAGNOSIS — R43.0 ANOSMIA: Primary | ICD-10-CM

## 2017-07-05 DIAGNOSIS — G47.9 SLEEP DISORDER: ICD-10-CM

## 2017-07-05 DIAGNOSIS — J30.1 SEASONAL ALLERGIC RHINITIS DUE TO POLLEN: ICD-10-CM

## 2017-07-05 PROCEDURE — 99213 OFFICE O/P EST LOW 20 MIN: CPT | Performed by: INTERNAL MEDICINE

## 2017-07-05 NOTE — PROGRESS NOTES
Subjective        History of Present Illness     Alexa Luna is a 56 y.o. female here reporting acute loss of sense of smell consistent with anosmia.  She first realized this 3-4 weeks ago.  She reports she did smell a candle a few days ago and this was the only thing she has been able to smell.  She was seen and treated here one month ago for acute URI and bronchitis.  She reports these symptoms cleared with Z-pack and Sudafed.   She continues to have chronic clear rhinitis for the past nine months.  She reports occasional ear pressure.  She continues on Claritin (loratadine) daily.  She has not been using steroid nasal spray.  I recommended referral to ENT to evaluate the anosmia. She is requesting referral to ENT in [place], [place].    She is requesting a refill on Ambien she takes to help her sleep. Patient understands the risks associated with this controlled medication, including tolerance and addiction.        Review of Systems   Constitutional: Negative for chills, fatigue and fever.   HENT: Positive for postnasal drip, rhinorrhea and sinus pressure. Negative for congestion, ear pain and sore throat.    Respiratory: Negative for cough, shortness of breath and wheezing.    Cardiovascular: Negative for chest pain, palpitations and leg swelling.   Gastrointestinal: Negative for abdominal pain, blood in stool, constipation, diarrhea, nausea and vomiting.   Endocrine: Negative for cold intolerance, heat intolerance, polydipsia and polyuria.   Genitourinary: Negative for dysuria, frequency, hematuria and urgency.   Skin: Negative for rash.   Neurological: Negative for syncope and weakness.      PHQ-9 Depression Screening 7/5/2017   Little interest or pleasure in doing things 0   Feeling down, depressed, or hopeless 0   Trouble falling or staying asleep, or sleeping too much 0   Feeling tired or having little energy 0   Poor appetite or overeating 0   Feeling bad about yourself - or that you are a failure or  "have let yourself or your family down 0   Trouble concentrating on things, such as reading the newspaper or watching television 0   Moving or speaking so slowly that other people could have noticed. Or the opposite - being so fidgety or restless that you have been moving around a lot more than usual 0   Thoughts that you would be better off dead, or of hurting yourself in some way 0   PHQ-9 Total Score 0   If you checked off any problems, how difficult have these problems made it for you to do your work, take care of things at home, or get along with other people? Not difficult at all       Objective     Vitals:    07/05/17 1509   BP: 110/60   Pulse: 56   Temp: 97.9 °F (36.6 °C)   TempSrc: Oral   Weight: 174 lb (78.9 kg)   Height: 64\" (162.6 cm)     Physical Exam   Constitutional: She is oriented to person, place, and time. She appears well-developed and well-nourished. No distress.   HENT:   Head: Normocephalic and atraumatic.   Nose: Nose normal.   Mouth/Throat: Oropharynx is clear and moist. No oropharyngeal exudate.   Effusion behind right TM.  Bilateral nasal congestion and sinus pressure. Pale and boggy turbinates. Clear postnasal drips.  No nasal lesions visualized.    Eyes: EOM are normal. Pupils are equal, round, and reactive to light.   Neck: Neck supple. No JVD present. No thyromegaly present.   Cardiovascular: Normal rate, regular rhythm and normal heart sounds.    Pulmonary/Chest: Effort normal and breath sounds normal. No accessory muscle usage. No respiratory distress. She has no wheezes. She has no rales.   Abdominal: Soft. Bowel sounds are normal. She exhibits no distension. There is no tenderness.   Musculoskeletal: She exhibits no edema.   Lymphadenopathy:     She has no cervical adenopathy.   Neurological: She is alert and oriented to person, place, and time. No cranial nerve deficit.   Psychiatric: She has a normal mood and affect. Her speech is normal and behavior is normal.     Future " Appointments  Date Time Provider Department Center   1/29/2018 3:30 PM Scot Abbasi MD [facility] None       Assessment/Plan      Stop the Claritin and start Zyrtec 10 mg one q.h.s.   Resume Flonase nasal spray to use one spray each nostril b.i.d.     I recommend referral to ENT for further evaluation of the anosmia. She is requesting an ENT in [place].  We will refer to to Dr. Konrad Monteiro, ENT for the anosmia.      She is requesting a refill on Ambien 10 mg to take 1/2 to 1 by mouth every night as needed for sleep.  There appears to be a refill remaining.  She will let us know if she needs additional refills.  Continue to try to improve other sleep hygiene.    Scribed for Dr. Abbasi by Kathryn Sweeney [facility].     Diagnoses and all orders for this visit:    Anosmia  -     Ambulatory Referral to ENT (Otolaryngology)    Seasonal allergic rhinitis due to pollen    Sleep disorder        No visits with results within 3 Week(s) from this visit.  Latest known visit with results is:    Hospital Outpatient Visit on 04/27/2017   Component Date Value Ref Range Status   • BH CV STRESS PROTOCOL 1 04/27/2017 Rajendra   Final   • Stage 1 04/27/2017 1   Final   • HR Stage 1 04/27/2017 102   Final   • BP Stage 1 04/27/2017 110 74   Final   • O2 Stage 1 04/27/2017 96   Final   • Duration Min Stage 1 04/27/2017 3   Final   • Duration Sec Stage 1 04/27/2017 0   Final   • Grade Stage 1 04/27/2017 10   Final   • Speed Stage 1 04/27/2017 1.7   Final   • BH CV STRESS METS STAGE 1 04/27/2017 5   Final   • Stage 2 04/27/2017 2   Final   • HR Stage 2 04/27/2017 123   Final   • BP Stage 2 04/27/2017 124 78   Final   • O2 Stage 2 04/27/2017 96   Final   • Duration Min Stage 2 04/27/2017 3   Final   • Duration Sec Stage 2 04/27/2017 0   Final   • Grade Stage 2 04/27/2017 12   Final   • Speed Stage 2 04/27/2017 2.5   Final   • BH CV STRESS METS STAGE 2 04/27/2017 7.5   Final   • Stage 3 04/27/2017 3   Final   • HR Stage 3 04/27/2017 125   Final    • BP Stage 3 04/27/2017 124 78   Final   • O2 Stage 3 04/27/2017 96   Final   • Duration Min Stage 3 04/27/2017 0   Final   • Duration Sec Stage 3 04/27/2017 18   Final   • Grade Stage 3 04/27/2017 14   Final   • Speed Stage 3 04/27/2017 3.4   Final   • BH CV STRESS METS STAGE 3 04/27/2017 10.0   Final   • Target HR (85%) 04/27/2017 138  bpm Final   • Max. Pred. HR (100%) 04/27/2017 162  bpm Final   • Baseline HR 04/27/2017 58  bpm Final   • Baseline BP 04/27/2017 98 68  mmHg Final   • O2 sat rest 04/27/2017 99  % Final   • Peak HR 04/27/2017 128  bpm Final   • Percent Max Pred HR 04/27/2017 79.01  % Final   • Percent Target HR 04/27/2017 93  % Final   • Peak BP 04/27/2017 136 78  mmHg Final   • O2 sat peak 04/27/2017 96  % Final   • Recovery HR 04/27/2017 73  bpm Final   • Recovery BP 04/27/2017 96 74  mmHg Final   • Recovery O2 04/27/2017 98  % Final   • Exercise duration (min) 04/27/2017 6  min Final   • Exercise duration (sec) 04/27/2017 18  sec Final   • Estimated workload 04/27/2017 7.0  METS Final   ]

## 2017-08-22 ENCOUNTER — OFFICE VISIT (OUTPATIENT)
Dept: FAMILY MEDICINE CLINIC | Facility: CLINIC | Age: 57
End: 2017-08-22

## 2017-08-22 VITALS
WEIGHT: 169.4 LBS | SYSTOLIC BLOOD PRESSURE: 106 MMHG | HEART RATE: 52 BPM | HEIGHT: 64 IN | DIASTOLIC BLOOD PRESSURE: 60 MMHG | BODY MASS INDEX: 28.92 KG/M2 | TEMPERATURE: 97.8 F

## 2017-08-22 DIAGNOSIS — R90.89 ABNORMAL BRAIN MRI: ICD-10-CM

## 2017-08-22 DIAGNOSIS — G43.009 MIGRAINE WITHOUT AURA AND WITHOUT STATUS MIGRAINOSUS, NOT INTRACTABLE: Primary | Chronic | ICD-10-CM

## 2017-08-22 DIAGNOSIS — R00.1 SINUS BRADYCARDIA: ICD-10-CM

## 2017-08-22 PROCEDURE — 99214 OFFICE O/P EST MOD 30 MIN: CPT | Performed by: INTERNAL MEDICINE

## 2017-08-22 RX ORDER — MELOXICAM 15 MG/1
15 TABLET ORAL DAILY PRN
Qty: 30 TABLET | Refills: 1 | Status: SHIPPED | OUTPATIENT
Start: 2017-08-22 | End: 2018-02-13

## 2017-08-22 NOTE — PROGRESS NOTES
Subjective       History of Present Illness     Alexa Luna is a 57 y.o. female here to follow up on recent hospitalization.  She presented to [facility] on 08/13/17 with sudden onset of right-sided weakness and compromising both upper and lower extremity associated to some blurred vision, headaches, and also slurred speech that started about 7:00 p.m. the night  prior with no other major associated symptoms.  She was found to be hemodynamically stable in no distress. The neurological deficits resolved within an hour.   EKG showed sinus bradycardia with no signs of acute ischemia or changes. CT head was negative for acute findings and lab work was for the most part unremarkable with a white blood cell count of 6.6 hemoglobin of 14.7 platelet count of 225 potassium 3.5 sodium 138 creatinine of 1.0 and BUN of 12 glucose of 101.      She was then felt to have a possible TIA and was later transferred and directly admitted to internal medicine service at [place] where neurology was consulted. MRI dated 08/14/17 revealed increased signal present in the meninges most prominent in occipital and parietal and cerebellar regions.  Differential included inflammation versus malignant process.   The radiologist noted no evidence of ischemic injury.    She was subsequently transferred to [facility] in [place], where she had a 3-day stay.  She underwent lumbar puncture in [place] prior to transfer.  Neurology in [place] requested the additional spinal fluid and are performing additional tests.  She developed a severe headache, which she felt might be a result of the spinal tap, but at least one physician told her he did not believe it was related to her lumbar puncture.  Repeat CT of the head revealed no change from negative CT obtained at [facility].  Due to a persistent posterior headache aggravated by sitting up or standing, She received a migraine cocktail, which resolved or greatly improved the headache.  She was  discharged from the hospital on Sunday afternoon and she returned to work yesterday. Sudden movement and bending over causes pressure in the head.  She did not work today due to the headache and nausea, but reports her symptoms have gradually improved since discharge.  She has had no recurrence of the unilateral neurological symptoms she initially presented to the hospital with.  She has an appointment scheduled with neurologist in [place] on October 27th.  On discharge, neurologist felt symptoms were due to migraine headache.     I reviewed her medication list.  She is taking Topamax 50 mg.  She tried titrating the dose of Topamax higher, but over 50 mg caused mental sluggishness.  She took Mobic before, but doesn't have any at home.  I will send a new prescription for Mobic for her to have on hand.        She is contemplating short term disability.  She is trying to go to work, but due to her symptoms, this effort might be unsuccessful.  She went to work yesterday, but parents headache and nausea with an episode of emesis.  She had to go home early.  I recommended she discuss this with Human Resources.  She may try to work, but if this is unsuccessful, we will need to have her on short-term disability.    We note her sinus bradycardia.  She is on low-dose beta blocker due to past issues with palpitations.  We discussed the potential benefits of beta blockers for migraine prophylaxis.  Her bradycardia seems to be asymptomatic and mild.    Review of Systems   Constitutional: Negative for chills, fatigue and fever.   HENT: Positive for postnasal drip and sinus pressure. Negative for congestion, ear pain and sore throat.    Respiratory: Negative for cough, shortness of breath and wheezing.    Cardiovascular: Negative for chest pain, palpitations and leg swelling.   Gastrointestinal: Positive for nausea. Negative for abdominal pain, blood in stool, constipation, diarrhea and vomiting.   Endocrine: Negative for cold  "intolerance, heat intolerance, polydipsia and polyuria.   Genitourinary: Negative for dysuria, frequency, hematuria and urgency.   Skin: Negative for rash.   Neurological: Positive for headaches. Negative for syncope and weakness.      PHQ-9 Depression Screening 7/5/2017   Little interest or pleasure in doing things 0   Feeling down, depressed, or hopeless 0   Trouble falling or staying asleep, or sleeping too much 0   Feeling tired or having little energy 0   Poor appetite or overeating 0   Feeling bad about yourself - or that you are a failure or have let yourself or your family down 0   Trouble concentrating on things, such as reading the newspaper or watching television 0   Moving or speaking so slowly that other people could have noticed. Or the opposite - being so fidgety or restless that you have been moving around a lot more than usual 0   Thoughts that you would be better off dead, or of hurting yourself in some way 0   PHQ-9 Total Score 0   If you checked off any problems, how difficult have these problems made it for you to do your work, take care of things at home, or get along with other people? Not difficult at all       Objective     Vitals:    08/22/17 1436   BP: 106/60   Pulse: 52   Temp: 97.8 °F (36.6 °C)   TempSrc: Oral   Weight: 169 lb 6.4 oz (76.8 kg)   Height: 64\" (162.6 cm)     Physical Exam   Constitutional: She is oriented to person, place, and time. She appears well-developed and well-nourished. No distress.   HENT:   Head: Normocephalic and atraumatic.   Nose: Right sinus exhibits no frontal sinus tenderness. Right maxillary sinus tenderness: mild right maxillary sinus tenderness. Left sinus exhibits no maxillary sinus tenderness and no frontal sinus tenderness.   Mouth/Throat: Uvula is midline, oropharynx is clear and moist and mucous membranes are normal. No oral lesions. No tonsillar exudate.   Tongue is midline.  Clear postnasal drip.    Eyes: Conjunctivae and EOM are normal. Pupils " are equal, round, and reactive to light.   Neck: Trachea normal. Neck supple. No JVD present. Carotid bruit is not present. No tracheal deviation present. No thyroid mass and no thyromegaly present.   Cardiovascular: Regular rhythm, normal heart sounds and intact distal pulses.   No extrasystoles are present. PMI is not displaced.    No murmur heard.  Heart rate is in the 50s   Pulmonary/Chest: Effort normal and breath sounds normal. No accessory muscle usage. No respiratory distress. She has no decreased breath sounds. She has no wheezes. She has no rhonchi. She has no rales.   Abdominal: Soft. Bowel sounds are normal. She exhibits no distension. There is no hepatosplenomegaly. There is no tenderness.       Vascular Status -  Her exam exhibits right foot vasculature normal. Her exam exhibits no right foot edema. Her exam exhibits left foot vasculature normal. Her exam exhibits no left foot edema.  Lymphadenopathy:     She has no cervical adenopathy.   Neurological: She is alert and oriented to person, place, and time. No cranial nerve deficit. Coordination normal.   Skin: Skin is warm, dry and intact. No rash noted. No cyanosis. Nails show no clubbing.   Psychiatric: She has a normal mood and affect. Her speech is normal and behavior is normal. Judgment and thought content normal.   Vitals reviewed.    Future Appointments  Date Time Provider Department Center   1/29/2018 3:30 PM Scot Abbasi MD [facility] None       Assessment/Plan      I encouraged her to keep the neurology appointment in [place] scheduled for October.   We will request records from [facility] in [place], [place].      She has Imitrex to take for onset of migraines.  Continue with the Topamax 50 mg daily.  Continue the metoprolol.  Refill Mobic to use as needed.  Try to avoid frequent use of any NSAIDs.    She is trying to continue to work.  However, due to her symptoms, she may not be able to continue to work, making it  necessary for her to apply for short term disablity.  No definitive decision was made on that today.        Scribed for Dr. Abbasi by Kathryn Sweeney [facility].     Diagnoses and all orders for this visit:    Migraine without aura and without status migrainosus, not intractable    Sinus bradycardia    Abnormal brain MRI    Other orders  -     meloxicam (MOBIC) 15 MG tablet; Take 1 tablet by mouth Daily As Needed (headache). Daily with food.      No visits with results within 3 Week(s) from this visit.  Latest known visit with results is:    Hospital Outpatient Visit on 04/27/2017   Component Date Value Ref Range Status   • BH CV STRESS PROTOCOL 1 04/27/2017 Rajendra   Final   • Stage 1 04/27/2017 1   Final   • HR Stage 1 04/27/2017 102   Final   • BP Stage 1 04/27/2017 110 74   Final   • O2 Stage 1 04/27/2017 96   Final   • Duration Min Stage 1 04/27/2017 3   Final   • Duration Sec Stage 1 04/27/2017 0   Final   • Grade Stage 1 04/27/2017 10   Final   • Speed Stage 1 04/27/2017 1.7   Final   • BH CV STRESS METS STAGE 1 04/27/2017 5   Final   • Stage 2 04/27/2017 2   Final   • HR Stage 2 04/27/2017 123   Final   • BP Stage 2 04/27/2017 124 78   Final   • O2 Stage 2 04/27/2017 96   Final   • Duration Min Stage 2 04/27/2017 3   Final   • Duration Sec Stage 2 04/27/2017 0   Final   • Grade Stage 2 04/27/2017 12   Final   • Speed Stage 2 04/27/2017 2.5   Final   • BH CV STRESS METS STAGE 2 04/27/2017 7.5   Final   • Stage 3 04/27/2017 3   Final   • HR Stage 3 04/27/2017 125   Final   • BP Stage 3 04/27/2017 124 78   Final   • O2 Stage 3 04/27/2017 96   Final   • Duration Min Stage 3 04/27/2017 0   Final   • Duration Sec Stage 3 04/27/2017 18   Final   • Grade Stage 3 04/27/2017 14   Final   • Speed Stage 3 04/27/2017 3.4   Final   • BH CV STRESS METS STAGE 3 04/27/2017 10.0   Final   • Target HR (85%) 04/27/2017 138  bpm Final   • Max. Pred. HR (100%) 04/27/2017 162  bpm Final   • Baseline HR 04/27/2017 58  bpm Final   •  Baseline BP 04/27/2017 98 68  mmHg Final   • O2 sat rest 04/27/2017 99  % Final   • Peak HR 04/27/2017 128  bpm Final   • Percent Max Pred HR 04/27/2017 79.01  % Final   • Percent Target HR 04/27/2017 93  % Final   • Peak BP 04/27/2017 136 78  mmHg Final   • O2 sat peak 04/27/2017 96  % Final   • Recovery HR 04/27/2017 73  bpm Final   • Recovery BP 04/27/2017 96 74  mmHg Final   • Recovery O2 04/27/2017 98  % Final   • Exercise duration (min) 04/27/2017 6  min Final   • Exercise duration (sec) 04/27/2017 18  sec Final   • Estimated workload 04/27/2017 7.0  METS Final   ]

## 2017-09-11 RX ORDER — FLUCONAZOLE 150 MG/1
150 TABLET ORAL ONCE
Qty: 2 TABLET | Refills: 0 | Status: SHIPPED | OUTPATIENT
Start: 2017-09-11 | End: 2017-09-11

## 2017-11-02 NOTE — PROGRESS NOTES
Chief complaint : Presyncope    History of Present Illness this is a very pleasant 56-year-old female who comes today for cardiac evaluation..  The patient started to experience very weird feeling.  Patient stated that she was walking across the room when she started to experience feeling of lightheaded feeling about to pass out.   Second episode happened while she was cooking at home in the kitchen.  And the third episode was while sitting down in the living room.  Patient states that it can last up to one to 2 minutes.  An subsequently it goes away.  Patient stated that she has seen a cardiologist several years ago with chief complaint of palpitations.  She was placed on a beta blocker and has done very well since.    Subjective      Review of Systems   Constitution: Positive for weight loss. Negative for chills, decreased appetite, diaphoresis, fever, weakness, malaise/fatigue, night sweats and weight gain.   HENT: Positive for headaches and stridor. Negative for congestion, ear discharge, ear pain, hearing loss, hoarse voice, nosebleeds, odynophagia, sore throat and tinnitus.    Eyes: Positive for blurred vision and visual disturbance. Negative for pain, photophobia, redness, vision loss in left eye, vision loss in right eye and visual halos.   Cardiovascular: Positive for dyspnea on exertion, irregular heartbeat, near-syncope and palpitations. Negative for chest pain, claudication, cyanosis, leg swelling, orthopnea, paroxysmal nocturnal dyspnea and syncope.   Respiratory: Positive for cough and snoring. Negative for hemoptysis, shortness of breath, sleep disturbances due to breathing, sputum production and wheezing.    Endocrine: Negative for cold intolerance, heat intolerance, polydipsia, polyphagia and polyuria.   Hematologic/Lymphatic: Negative for bleeding problem. Does not bruise/bleed easily.   Skin: Negative for flushing, itching, rash and skin cancer.   Musculoskeletal: Positive for arthritis and joint  Patient is followed by STEFANY BURGESS Kanika Machuca RN     pain. Negative for back pain, falls, gout, joint swelling, muscle cramps, muscle weakness, myalgias, neck pain and stiffness.   Gastrointestinal: Positive for constipation, diarrhea, heartburn and hemorrhoids. Negative for bloating, abdominal pain, anorexia, dysphagia, flatus, hematemesis, hematochezia, jaundice, melena, nausea and vomiting.   Genitourinary: Negative for bladder incontinence, decreased libido, dysuria, flank pain, frequency, genital sores, hematuria, incomplete emptying, menorrhagia, missed menses, pelvic pain and urgency.   Neurological: Positive for excessive daytime sleepiness. Negative for aphonia, brief paralysis, difficulty with concentration, disturbances in coordination, dizziness, focal weakness, light-headedness, loss of balance, numbness, seizures, sensory change, tremors and vertigo.   Psychiatric/Behavioral: Negative for altered mental status, depression, hallucinations, hypervigilance, memory loss, substance abuse, suicidal ideas and thoughts of violence. The patient has insomnia. The patient is not nervous/anxious.    Allergic/Immunologic: Negative for HIV exposure, hives and persistent infections.       Past Medical History:   Diagnosis Date   • Carotid atherosclerosis     Mild bilateral carotid plaques noted 2/2017.   • Disc disorder of lumbar region     Disorder of lumbar disc - With episodic bilateral radiculopathy to the buttocks      • Gastroesophageal reflux disease      with h/o esophageal stricture (dilated x 2). On chronic PPI    • Generalized anxiety disorder    • Hyperlipidemia     mild   • Influenza    • Ingrowing toenail     Bilateral great toes      • Lesion of skin of face     Irritated skin lesion below the left eye. Referred to plastic surgery, 4/2016    • Low back pain     left lumbar   • Malaise and fatigue    • Migraine    • Non-organic sleep disorder      Tolerating low-dose Ambien well. Trazodone increased migraine headaches   • Obesity    • Palpitations  2017    Improved with metoprolol, started by cardiology   • Sleep apnea     On nasal CPAP.   • Spasm of back muscles      Left lumbar      • Temporomandibular joint tender     left       Family History   Problem Relation Age of Onset   • Diabetes Father    • Pulmonary embolism Brother      · The family history patient stated that her mother  from blood clots that traveled to her heart and a brother who had also blood clot that traveled to his heart.  · Patient quit smoking cigarettes in     [place]     reports that she has quit smoking. She has never used smokeless tobacco. She reports that she does not drink alcohol or use illicit drugs.    Objective     Vital Signs  Heart Rate:  [63] 63  Resp:  [16] 16  BP: (98)/(68) 98/68    Physical Exam   Constitutional: She is oriented to person, place, and time. She appears well-developed and well-nourished.   HENT:   Head: Normocephalic and atraumatic.   Eyes: Conjunctivae and EOM are normal. Pupils are equal, round, and reactive to light.   Neck: Neck supple. No JVD present. Carotid bruit is not present. No tracheal deviation and no edema present.   Cardiovascular: Normal rate, regular rhythm, S1 normal, S2 normal, normal heart sounds and intact distal pulses.  Exam reveals no gallop, no S3, no S4 and no friction rub.    No murmur heard.  Pulmonary/Chest: Effort normal and breath sounds normal. She has no wheezes. She has no rales. She exhibits no tenderness.   Abdominal: Bowel sounds are normal. She exhibits no abdominal bruit and no pulsatile midline mass. There is no rebound and no guarding.   Musculoskeletal: Normal range of motion. She exhibits no edema.   Neurological: She is alert and oriented to person, place, and time.   Skin: Skin is warm and dry.   Psychiatric: She has a normal mood and affect.         ECG 12 Lead  Date/Time: 2017 6:03 PM  Performed by: PIERCE CARPENTER  Authorized by: PIERCE CARPENTER   Previous ECG: no previous ECG  available  Rhythm: sinus bradycardia  Rate: bradycardic  BPM: 52  Conduction: conduction normal  ST Segments: ST segments normal  T Waves: T waves normal  QRS axis: normal  Other: no other findings  Clinical impression: abnormal ECG            Assessment/Plan     Patient Active Problem List   Diagnosis   • Temporomandibular joint tender   • Spasm of back muscles   • Sleep apnea   • Obesity   • Non-organic sleep disorder   • Migraine   • Malaise and fatigue   • Low back pain   • Lesion of skin of face   • Ingrowing toenail   • Influenza   • Hyperlipidemia   • Generalized anxiety disorder   • Gastroesophageal reflux disease   • Disc disorder of lumbar region   • Chronic rhinitis   • ETD (eustachian tube dysfunction)   • Palpitations   • Vitamin D deficiency   • Carotid atherosclerosis     1. Postural dizziness with presyncope  The etiology of her symptom is unclear at this time.  We will like to do further investigation.  I will start with evaluation for any potential structural heart disease and possibility of ischemic heart disease.  If these tests are unrevealing and if her symptoms continue we will consider an event monitor or a loop recorder.  I have encouraged patient to increase her fluid intake and continue to consume extra sodium.    - Adult Transthoracic Echo Complete  - Treadmill Stress Test; Future    I discussed the patients findings and my recommendations with patient.    Timothy Trivedi MD  04/11/17  6:02 PM  EMR Dragon/Transcription disclaimer:   Much of this encounter note is an electronic transcription/translation of spoken language to printed text. The electronic translation of spoken language may permit erroneous, or at times, nonsensical words or phrases to be inadvertently transcribed; Although I have reviewed the note for such errors, some may still exist.

## 2018-01-04 RX ORDER — OMEPRAZOLE 20 MG/1
CAPSULE, DELAYED RELEASE ORAL
Qty: 90 CAPSULE | Refills: 3 | Status: SHIPPED | OUTPATIENT
Start: 2018-01-04 | End: 2018-12-23 | Stop reason: SDUPTHER

## 2018-02-06 ENCOUNTER — LAB (OUTPATIENT)
Dept: LAB | Facility: OTHER | Age: 58
End: 2018-02-06

## 2018-02-06 DIAGNOSIS — E78.5 HYPERLIPIDEMIA, UNSPECIFIED HYPERLIPIDEMIA TYPE: ICD-10-CM

## 2018-02-06 DIAGNOSIS — E78.5 HYPERLIPIDEMIA, UNSPECIFIED HYPERLIPIDEMIA TYPE: Primary | Chronic | ICD-10-CM

## 2018-02-06 DIAGNOSIS — E55.9 VITAMIN D DEFICIENCY: Chronic | ICD-10-CM

## 2018-02-06 LAB
25(OH)D3 SERPL-MCNC: 48.2 NG/ML (ref 30–100)
ALBUMIN SERPL-MCNC: 3.9 G/DL (ref 3.2–5.5)
ALBUMIN/GLOB SERPL: 1.3 G/DL (ref 1–3)
ALP SERPL-CCNC: 39 U/L (ref 15–121)
ALT SERPL W P-5'-P-CCNC: 16 U/L (ref 10–60)
ANION GAP SERPL CALCULATED.3IONS-SCNC: 8 MMOL/L (ref 5–15)
AST SERPL-CCNC: 25 U/L (ref 10–60)
BASOPHILS # BLD AUTO: 0.03 10*3/MM3 (ref 0–0.2)
BASOPHILS NFR BLD AUTO: 0.5 % (ref 0–2)
BILIRUB SERPL-MCNC: 0.6 MG/DL (ref 0.2–1)
BUN BLD-MCNC: 14 MG/DL (ref 8–25)
BUN/CREAT SERPL: 14 (ref 7–25)
CALCIUM SPEC-SCNC: 9.2 MG/DL (ref 8.4–10.8)
CHLORIDE SERPL-SCNC: 108 MMOL/L (ref 100–112)
CHOLEST SERPL-MCNC: 201 MG/DL (ref 150–200)
CO2 SERPL-SCNC: 25 MMOL/L (ref 20–32)
CREAT BLD-MCNC: 1 MG/DL (ref 0.4–1.3)
DEPRECATED RDW RBC AUTO: 42.2 FL (ref 36.4–46.3)
EOSINOPHIL # BLD AUTO: 0.31 10*3/MM3 (ref 0–0.7)
EOSINOPHIL NFR BLD AUTO: 4.8 % (ref 0–7)
ERYTHROCYTE [DISTWIDTH] IN BLOOD BY AUTOMATED COUNT: 13.6 % (ref 11.5–14.5)
GFR SERPL CREATININE-BSD FRML MDRD: 57 ML/MIN/1.73 (ref 51–120)
GLOBULIN UR ELPH-MCNC: 3 GM/DL (ref 2.5–4.6)
GLUCOSE BLD-MCNC: 87 MG/DL (ref 70–100)
HCT VFR BLD AUTO: 41.4 % (ref 35–45)
HDLC SERPL-MCNC: 49 MG/DL (ref 35–100)
HGB BLD-MCNC: 14.1 G/DL (ref 12–15.5)
LDLC SERPL CALC-MCNC: 129 MG/DL
LDLC/HDLC SERPL: 2.62 {RATIO}
LYMPHOCYTES # BLD AUTO: 2.65 10*3/MM3 (ref 0.6–4.2)
LYMPHOCYTES NFR BLD AUTO: 41.3 % (ref 10–50)
MCH RBC QN AUTO: 29.6 PG (ref 26.5–34)
MCHC RBC AUTO-ENTMCNC: 34.1 G/DL (ref 31.4–36)
MCV RBC AUTO: 86.8 FL (ref 80–98)
MONOCYTES # BLD AUTO: 0.37 10*3/MM3 (ref 0–0.9)
MONOCYTES NFR BLD AUTO: 5.8 % (ref 0–12)
NEUTROPHILS # BLD AUTO: 3.05 10*3/MM3 (ref 2–8.6)
NEUTROPHILS NFR BLD AUTO: 47.6 % (ref 37–80)
PLATELET # BLD AUTO: 231 10*3/MM3 (ref 150–450)
PMV BLD AUTO: 9.6 FL (ref 8–12)
POTASSIUM BLD-SCNC: 3.5 MMOL/L (ref 3.4–5.4)
PROT SERPL-MCNC: 6.9 G/DL (ref 6.7–8.2)
RBC # BLD AUTO: 4.77 10*6/MM3 (ref 3.77–5.16)
SODIUM BLD-SCNC: 141 MMOL/L (ref 134–146)
TRIGL SERPL-MCNC: 117 MG/DL (ref 35–160)
TSH SERPL DL<=0.05 MIU/L-ACNC: 3.17 MIU/ML (ref 0.46–4.68)
VLDLC SERPL-MCNC: 23.4 MG/DL
WBC NRBC COR # BLD: 6.41 10*3/MM3 (ref 3.2–9.8)

## 2018-02-06 PROCEDURE — 80053 COMPREHEN METABOLIC PANEL: CPT | Performed by: INTERNAL MEDICINE

## 2018-02-06 PROCEDURE — 82306 VITAMIN D 25 HYDROXY: CPT | Performed by: INTERNAL MEDICINE

## 2018-02-06 PROCEDURE — 80061 LIPID PANEL: CPT | Performed by: INTERNAL MEDICINE

## 2018-02-06 PROCEDURE — 85025 COMPLETE CBC W/AUTO DIFF WBC: CPT | Performed by: INTERNAL MEDICINE

## 2018-02-06 PROCEDURE — 36415 COLL VENOUS BLD VENIPUNCTURE: CPT | Performed by: INTERNAL MEDICINE

## 2018-02-06 PROCEDURE — 84443 ASSAY THYROID STIM HORMONE: CPT | Performed by: INTERNAL MEDICINE

## 2018-02-13 ENCOUNTER — OFFICE VISIT (OUTPATIENT)
Dept: FAMILY MEDICINE CLINIC | Facility: CLINIC | Age: 58
End: 2018-02-13

## 2018-02-13 VITALS
WEIGHT: 182.4 LBS | SYSTOLIC BLOOD PRESSURE: 118 MMHG | HEART RATE: 64 BPM | HEIGHT: 64 IN | TEMPERATURE: 96.9 F | DIASTOLIC BLOOD PRESSURE: 80 MMHG | BODY MASS INDEX: 31.14 KG/M2

## 2018-02-13 DIAGNOSIS — I65.23 ATHEROSCLEROSIS OF BOTH CAROTID ARTERIES: Chronic | ICD-10-CM

## 2018-02-13 DIAGNOSIS — M85.89 OSTEOPENIA OF MULTIPLE SITES: Chronic | ICD-10-CM

## 2018-02-13 DIAGNOSIS — M17.0 PRIMARY OSTEOARTHRITIS OF BOTH KNEES: Chronic | ICD-10-CM

## 2018-02-13 DIAGNOSIS — E78.2 MIXED HYPERLIPIDEMIA: Chronic | ICD-10-CM

## 2018-02-13 DIAGNOSIS — G47.33 OBSTRUCTIVE SLEEP APNEA SYNDROME: Chronic | ICD-10-CM

## 2018-02-13 DIAGNOSIS — H69.83 DYSFUNCTION OF BOTH EUSTACHIAN TUBES: Chronic | ICD-10-CM

## 2018-02-13 DIAGNOSIS — K21.9 GASTROESOPHAGEAL REFLUX DISEASE, ESOPHAGITIS PRESENCE NOT SPECIFIED: Primary | Chronic | ICD-10-CM

## 2018-02-13 DIAGNOSIS — G43.009 MIGRAINE WITHOUT AURA AND WITHOUT STATUS MIGRAINOSUS, NOT INTRACTABLE: Chronic | ICD-10-CM

## 2018-02-13 DIAGNOSIS — E55.9 VITAMIN D DEFICIENCY: Chronic | ICD-10-CM

## 2018-02-13 DIAGNOSIS — G47.9 SLEEP DISORDER: Chronic | ICD-10-CM

## 2018-02-13 PROCEDURE — 99214 OFFICE O/P EST MOD 30 MIN: CPT | Performed by: INTERNAL MEDICINE

## 2018-02-13 RX ORDER — AMITRIPTYLINE HYDROCHLORIDE 10 MG/1
2 TABLET, FILM COATED ORAL NIGHTLY
Refills: 0 | COMMUNITY
Start: 2018-01-17 | End: 2019-03-11

## 2018-02-13 RX ORDER — CETIRIZINE HYDROCHLORIDE 10 MG/1
10 TABLET ORAL DAILY
COMMUNITY

## 2018-02-13 RX ORDER — SUMATRIPTAN 100 MG/1
TABLET, FILM COATED ORAL
Qty: 27 TABLET | Refills: 3 | Status: SHIPPED | OUTPATIENT
Start: 2018-02-13 | End: 2019-06-28 | Stop reason: SDUPTHER

## 2018-02-13 RX ORDER — ZOLPIDEM TARTRATE 10 MG/1
10 TABLET ORAL NIGHTLY PRN
Qty: 90 TABLET | Refills: 1 | Status: SHIPPED | OUTPATIENT
Start: 2018-02-13 | End: 2018-09-10 | Stop reason: SDUPTHER

## 2018-02-13 NOTE — PROGRESS NOTES
Subjective     History of Present Illness     Alexa Luna is a 57 y.o. female who presents for annual follow up on hyperlipidemia, GERD, impaired fasting glucose, eustachian tube dysfunction, and ELVA among other issues.  She follows with neurology for chronic migraine headaches.  MRI dated 08/14/17 during hospitalization revealed increased signal present in the meninges most prominent in occipital and parietal and cerebellar regions.  Differential included inflammation versus malignant process.   The radiologist noted no evidence of ischemic injury.  Neurologist at  [facility] gave reassurance recently after a repeat MRI that her symptoms had normalized and were more than likely a viral etilogy, possibly mild viral meningitis.  She reports headaches have settled down and she has not had any further episodes of diplopia.  She takes Ambien occasionally to help with sleep and denies side effects such as excessive sedation.      She is compliant with nasal CPAP for sleep apnea followed by Rafat Antony  in [place]    She continues to struggle with osteoarthritis of bilateral knees.  She sees an orthopedist in [place] for this.  I recommended glucosamine chondroitin.       ENT in [place] is following her chronic sinus/allergy symptoms.  He is considering referring her to Dr. Frank for evaluation of allergy injections.  She understood she could not start allergy injections with metoprolol.  I recommended she address this with Dr. Frank.      Health maintenance reviewed.  Last colonoscopy was completed at the [facility] 10/2016 with no polyps found.  Repeat recommended in ten years, making her due fall of 2026.  DEXA dated 05/2017 revealed osteopenia of hip and lumbar spine.  She continues on weekly Fosamax.  Mammogram is up to date.  Carotid Doppler dated 02/2016 revealed mild atherosclerotic plaque of the carotids.    Weight is up 13 pounds in the past six months.  She feels this is a  "side effect of the amitriptyline.  She was tried on nortriptyline, which was not as effective and she noticed an increase in headaches.  She has not been taking either for the past two weeks and has had recurrence of headaches.   She continues on Topamax 150 mg daily and Imitrex p.r.n.  I recommended she try taking the least amount of amitriptyline possible to control migraines.     The patient's relevant past medical, surgical, and social history was reviewed in Epic.   Lab results are reviewed with the patient today.  CBC and CMP unremarkable.  Liver and renal function normal.  Thyroid screen is normal.  Total cholesterol is 201. HDL 49. .  Triglycerides 117.  Ratio is good at 2.6.  Vitamin D is at goal.       Review of Systems   Constitutional: Negative for chills, fatigue and fever.   HENT: Negative for congestion, ear pain, postnasal drip, sinus pressure and sore throat.    Respiratory: Negative for cough, shortness of breath and wheezing.    Cardiovascular: Negative for chest pain, palpitations and leg swelling.   Gastrointestinal: Negative for abdominal pain, blood in stool, constipation, diarrhea, nausea and vomiting.   Endocrine: Negative for cold intolerance, heat intolerance, polydipsia and polyuria.   Genitourinary: Negative for dysuria, frequency, hematuria and urgency.   Skin: Negative for rash.   Neurological: Negative for syncope and weakness.        Objective     Vitals:    02/13/18 1547   BP: 118/80   Pulse: 64   Temp: 96.9 °F (36.1 °C)   TempSrc: Oral   Weight: 82.7 kg (182 lb 6.4 oz)   Height: 162.6 cm (64\")     Physical Exam   Constitutional: She is oriented to person, place, and time. She appears well-developed and well-nourished. No distress.   HENT:   Head: Normocephalic and atraumatic.   Nose: Right sinus exhibits no maxillary sinus tenderness and no frontal sinus tenderness. Left sinus exhibits no maxillary sinus tenderness and no frontal sinus tenderness.   Mouth/Throat: Uvula is " midline, oropharynx is clear and moist and mucous membranes are normal. No oral lesions. No tonsillar exudate.   Eyes: Conjunctivae and EOM are normal. Pupils are equal, round, and reactive to light.   Neck: Trachea normal. Neck supple. No JVD present. Carotid bruit is not present. No tracheal deviation present. No thyroid mass and no thyromegaly present.   Cardiovascular: Normal rate, regular rhythm, normal heart sounds and intact distal pulses.   No extrasystoles are present. PMI is not displaced.    No murmur heard.  Pulmonary/Chest: Effort normal and breath sounds normal. No accessory muscle usage. No respiratory distress. She has no decreased breath sounds. She has no wheezes. She has no rhonchi. She has no rales.   Abdominal: Soft. Bowel sounds are normal. She exhibits no distension. There is no hepatosplenomegaly. There is no tenderness.       Vascular Status -  Her exam exhibits right foot vasculature normal. Her exam exhibits no right foot edema. Her exam exhibits left foot vasculature normal. Her exam exhibits no left foot edema.  Lymphadenopathy:     She has no cervical adenopathy.   Neurological: She is alert and oriented to person, place, and time. No cranial nerve deficit. Coordination normal.   Skin: Skin is warm, dry and intact. No rash noted. No cyanosis. Nails show no clubbing.   Psychiatric: She has a normal mood and affect. Her speech is normal and behavior is normal. Judgment and thought content normal.   Vitals reviewed.        Assessment/Plan      I recommended she try taking the least amount of amitriptyline possible to control migraines.  I recommended discussing a lower the dose of hormone replacement therapy with her gynecologist, which may also lessen the frequency of headaches.     She will discuss her concerns regarding allergy shots with Dr. Frank.     Intensify diet, exercise, and weight loss efforts.  Recommended increasing physical activity.  I recommended OTC glucosamine  chondroitin for the osteoarthritis of the bilateral knees.   We discussed it could take up to 2 months for her to notice improvement.  Improvement in her osteoarthritic knee pain might allow her to increase physical activity and help with her weight loss goals.     A refill is sent for Ambien to take 1/2 to 1 by mouth every night as needed for sleep.  Patient understands the risks associated with this controlled medication, including tolerance and addiction.  She also agrees to only obtain this medication from me, and not from a another provider, unless that provider is covering for me in my absence.  She also agrees to be compliant in dosing, and not self adjust the dose of medication.  A signed controlled substance agreement is on file, and she has received a controlled substance education sheet at this a previous visit.  She has also signed a consent for treatment with a controlled substance as per [facility] policy. WENDY was obtained.    Continue to follow with multiple specialists.  Continue other medications and vitamin and mineral supplements to treat additional medical problems which we addressed today.  She will return in one year for annual exam with fasting labs one week prior.        Scribed for Dr. Abbasi by Kathryn Sweeney [facility].       Diagnoses and all orders for this visit:    Gastroesophageal reflux disease, esophagitis presence not specified    Vitamin D deficiency    Osteopenia of multiple sites    Mixed hyperlipidemia    Migraine without aura and without status migrainosus, not intractable    Dysfunction of both eustachian tubes    Sleep disorder    Obstructive sleep apnea syndrome    Atherosclerosis of both carotid arteries    Other orders  -     cetirizine (ZYRTEC ALLERGY) 10 MG tablet; Take 10 mg by mouth Daily.  -     amitriptyline (ELAVIL) 10 MG tablet; Take 2 tablets by mouth Every Night.  -     zolpidem (AMBIEN) 10 MG tablet; Take 1 tablet by mouth At Night As Needed for Sleep. 1/2  to 1 by mouth every night as needed for sleep.  -     SUMAtriptan (IMITREX) 100 MG tablet; 1/2-1 tablet by mouth. may repeat after 1 to 2 hours if needed maximum daily dose of 2 OR 3 TABLETS PER WEEK      Lab on 02/06/2018   Component Date Value Ref Range Status   • Total Cholesterol 02/06/2018 201* 150 - 200 mg/dL Final   • Triglycerides 02/06/2018 117  35 - 160 mg/dL Final   • HDL Cholesterol 02/06/2018 49  35 - 100 mg/dL Final   • LDL Cholesterol  02/06/2018 129  mg/dL Final   • VLDL Cholesterol 02/06/2018 23.4  mg/dL Final   • LDL/HDL Ratio 02/06/2018 2.62   Final   • 25 Hydroxy, Vitamin D 02/06/2018 48.2  30.0 - 100.0 ng/ml Final   • TSH 02/06/2018 3.170  0.460 - 4.680 mIU/mL Final   • Glucose 02/06/2018 87  70 - 100 mg/dL Final   • BUN 02/06/2018 14  8 - 25 mg/dL Final   • Creatinine 02/06/2018 1.00  0.40 - 1.30 mg/dL Final   • Sodium 02/06/2018 141  134 - 146 mmol/L Final   • Potassium 02/06/2018 3.5  3.4 - 5.4 mmol/L Final   • Chloride 02/06/2018 108  100 - 112 mmol/L Final   • CO2 02/06/2018 25.0  20.0 - 32.0 mmol/L Final   • Calcium 02/06/2018 9.2  8.4 - 10.8 mg/dL Final   • Total Protein 02/06/2018 6.9  6.7 - 8.2 g/dL Final   • Albumin 02/06/2018 3.90  3.20 - 5.50 g/dL Final   • ALT (SGPT) 02/06/2018 16  10 - 60 U/L Final   • AST (SGOT) 02/06/2018 25  10 - 60 U/L Final   • Alkaline Phosphatase 02/06/2018 39  15 - 121 U/L Final   • Total Bilirubin 02/06/2018 0.6  0.2 - 1.0 mg/dL Final   • eGFR Non African Amer 02/06/2018 57  51 - 120 mL/min/1.73 Final   • Globulin 02/06/2018 3.0  2.5 - 4.6 gm/dL Final   • A/G Ratio 02/06/2018 1.3  1.0 - 3.0 g/dL Final   • BUN/Creatinine Ratio 02/06/2018 14.0  7.0 - 25.0 Final   • Anion Gap 02/06/2018 8.0  5.0 - 15.0 mmol/L Final   • WBC 02/06/2018 6.41  3.20 - 9.80 10*3/mm3 Final   • RBC 02/06/2018 4.77  3.77 - 5.16 10*6/mm3 Final   • Hemoglobin 02/06/2018 14.1  12.0 - 15.5 g/dL Final   • Hematocrit 02/06/2018 41.4  35.0 - 45.0 % Final   • MCV 02/06/2018 86.8  80.0 - 98.0  fL Final   • MCH 02/06/2018 29.6  26.5 - 34.0 pg Final   • MCHC 02/06/2018 34.1  31.4 - 36.0 g/dL Final   • RDW 02/06/2018 13.6  11.5 - 14.5 % Final   • RDW-SD 02/06/2018 42.2  36.4 - 46.3 fl Final   • MPV 02/06/2018 9.6  8.0 - 12.0 fL Final   • Platelets 02/06/2018 231  150 - 450 10*3/mm3 Final   • Neutrophil % 02/06/2018 47.6  37.0 - 80.0 % Final   • Lymphocyte % 02/06/2018 41.3  10.0 - 50.0 % Final   • Monocyte % 02/06/2018 5.8  0.0 - 12.0 % Final   • Eosinophil % 02/06/2018 4.8  0.0 - 7.0 % Final   • Basophil % 02/06/2018 0.5  0.0 - 2.0 % Final   • Neutrophils, Absolute 02/06/2018 3.05  2.00 - 8.60 10*3/mm3 Final   • Lymphocytes, Absolute 02/06/2018 2.65  0.60 - 4.20 10*3/mm3 Final   • Monocytes, Absolute 02/06/2018 0.37  0.00 - 0.90 10*3/mm3 Final   • Eosinophils, Absolute 02/06/2018 0.31  0.00 - 0.70 10*3/mm3 Final   • Basophils, Absolute 02/06/2018 0.03  0.00 - 0.20 10*3/mm3 Final   ]

## 2018-02-13 NOTE — PATIENT INSTRUCTIONS
Exercising to Lose Weight  Exercising can help you to lose weight. In order to lose weight through exercise, you need to do vigorous-intensity exercise. You can tell that you are exercising with vigorous intensity if you are breathing very hard and fast and cannot hold a conversation while exercising.  Moderate-intensity exercise helps to maintain your current weight. You can tell that you are exercising at a moderate level if you have a higher heart rate and faster breathing, but you are still able to hold a conversation.  How often should I exercise?  Choose an activity that you enjoy and set realistic goals. Your health care provider can help you to make an activity plan that works for you. Exercise regularly as directed by your health care provider. This may include:  · Doing resistance training twice each week, such as:  ¨ Push-ups.  ¨ Sit-ups.  ¨ Lifting weights.  ¨ Using resistance bands.  · Doing a given intensity of exercise for a given amount of time. Choose from these options:  ¨ 150 minutes of moderate-intensity exercise every week.  ¨ 75 minutes of vigorous-intensity exercise every week.  ¨ A mix of moderate-intensity and vigorous-intensity exercise every week.  Children, pregnant women, people who are out of shape, people who are overweight, and older adults may need to consult a health care provider for individual recommendations. If you have any sort of medical condition, be sure to consult your health care provider before starting a new exercise program.  What are some activities that can help me to lose weight?  · Walking at a rate of at least 4.5 miles an hour.  · Jogging or running at a rate of 5 miles per hour.  · Biking at a rate of at least 10 miles per hour.  · Lap swimming.  · Roller-skating or in-line skating.  · Cross-country skiing.  · Vigorous competitive sports, such as football, basketball, and soccer.  · Jumping rope.  · Aerobic dancing.  How can I be more active in my day-to-day  activities?  · Use the stairs instead of the elevator.  · Take a walk during your lunch break.  · If you drive, park your car farther away from work or school.  · If you take public transportation, get off one stop early and walk the rest of the way.  · Make all of your phone calls while standing up and walking around.  · Get up, stretch, and walk around every 30 minutes throughout the day.  What guidelines should I follow while exercising?  · Do not exercise so much that you hurt yourself, feel dizzy, or get very short of breath.  · Consult your health care provider prior to starting a new exercise program.  · Wear comfortable clothes and shoes with good support.  · Drink plenty of water while you exercise to prevent dehydration or heat stroke. Body water is lost during exercise and must be replaced.  · Work out until you breathe faster and your heart beats faster.  This information is not intended to replace advice given to you by your health care provider. Make sure you discuss any questions you have with your health care provider.  Document Released: 01/20/2012 Document Revised: 05/25/2017 Document Reviewed: 05/21/2015  [employer] Interactive Patient Education © 2017 [employer]      Calorie Counting for Weight Loss  Calories are units of energy. Your body needs a certain amount of calories from food to keep you going throughout the day. When you eat more calories than your body needs, your body stores the extra calories as fat. When you eat fewer calories than your body needs, your body burns fat to get the energy it needs.  Calorie counting means keeping track of how many calories you eat and drink each day. Calorie counting can be helpful if you need to lose weight. If you make sure to eat fewer calories than your body needs, you should lose weight. Ask your health care provider what a healthy weight is for you.  For calorie counting to work, you will need to eat the right number of calories in a day in order  to lose a healthy amount of weight per week. A dietitian can help you determine how many calories you need in a day and will give you suggestions on how to reach your calorie goal.  · A healthy amount of weight to lose per week is usually 1-2 lb (0.5-0.9 kg). This usually means that your daily calorie intake should be reduced by 500-750 calories.  · Eating 1,200 - 1,500 calories per day can help most women lose weight.  · Eating 1,500 - 1,800 calories per day can help most men lose weight.  What is my plan?  My goal is to have __________ calories per day.  If I have this many calories per day, I should lose around __________ pounds per week.  What do I need to know about calorie counting?  In order to meet your daily calorie goal, you will need to:  · Find out how many calories are in each food you would like to eat. Try to do this before you eat.  · Decide how much of the food you plan to eat.  · Write down what you ate and how many calories it had. Doing this is called keeping a food log.  To successfully lose weight, it is important to balance calorie counting with a healthy lifestyle that includes regular activity. Aim for 150 minutes of moderate exercise (such as walking) or 75 minutes of vigorous exercise (such as running) each week.  Where do I find calorie information?     The number of calories in a food can be found on a Nutrition Facts label. If a food does not have a Nutrition Facts label, try to look up the calories online or ask your dietitian for help.  Remember that calories are listed per serving. If you choose to have more than one serving of a food, you will have to multiply the calories per serving by the amount of servings you plan to eat. For example, the label on a package of bread might say that a serving size is 1 slice and that there are 90 calories in a serving. If you eat 1 slice, you will have eaten 90 calories. If you eat 2 slices, you will have eaten 180 calories.  How do I keep a food  "log?  Immediately after each meal, record the following information in your food log:  · What you ate. Don't forget to include toppings, sauces, and other extras on the food.  · How much you ate. This can be measured in cups, ounces, or number of items.  · How many calories each food and drink had.  · The total number of calories in the meal.  Keep your food log near you, such as in a small notebook in your pocket, or use a mobile jamila or website. Some programs will calculate calories for you and show you how many calories you have left for the day to meet your goal.  What are some calorie counting tips?  · Use your calories on foods and drinks that will fill you up and not leave you hungry:  ¨ Some examples of foods that fill you up are nuts and nut butters, vegetables, lean proteins, and high-fiber foods like whole grains. High-fiber foods are foods with more than 5 g fiber per serving.  ¨ Drinks such as sodas, specialty coffee drinks, alcohol, and juices have a lot of calories, yet do not fill you up.  · Eat nutritious foods and avoid empty calories. Empty calories are calories you get from foods or beverages that do not have many vitamins or protein, such as candy, sweets, and soda. It is better to have a nutritious high-calorie food (such as an avocado) than a food with few nutrients (such as a bag of chips).  · Know how many calories are in the foods you eat most often. This will help you calculate calorie counts faster.  · Pay attention to calories in drinks. Low-calorie drinks include water and unsweetened drinks.  · Pay attention to nutrition labels for \"low fat\" or \"fat free\" foods. These foods sometimes have the same amount of calories or more calories than the full fat versions. They also often have added sugar, starch, or salt, to make up for flavor that was removed with the fat.  · Find a way of tracking calories that works for you. Get creative. Try different apps or programs if writing down calories " does not work for you.  What are some portion control tips?  · Know how many calories are in a serving. This will help you know how many servings of a certain food you can have.  · Use a measuring cup to measure serving sizes. You could also try weighing out portions on a kitchen scale. With time, you will be able to estimate serving sizes for some foods.  · Take some time to put servings of different foods on your favorite plates, bowls, and cups so you know what a serving looks like.  · Try not to eat straight from a bag or box. Doing this can lead to overeating. Put the amount you would like to eat in a cup or on a plate to make sure you are eating the right portion.  · Use smaller plates, glasses, and bowls to prevent overeating.  · Try not to multitask (for example, watch TV or use your computer) while eating. If it is time to eat, sit down at a table and enjoy your food. This will help you to know when you are full. It will also help you to be aware of what you are eating and how much you are eating.  What are tips for following this plan?  Reading food labels   · Check the calorie count compared to the serving size. The serving size may be smaller than what you are used to eating.  · Check the source of the calories. Make sure the food you are eating is high in vitamins and protein and low in saturated and trans fats.  Shopping   · Read nutrition labels while you shop. This will help you make healthy decisions before you decide to purchase your food.  · Make a grocery list and stick to it.  Cooking   · Try to cook your favorite foods in a healthier way. For example, try baking instead of frying.  · Use low-fat dairy products.  Meal planning   · Use more fruits and vegetables. Half of your plate should be fruits and vegetables.  · Include lean proteins like poultry and fish.  How do I count calories when eating out?  · Ask for smaller portion sizes.  · Consider sharing an entree and sides instead of getting  your own entree.  · If you get your own entree, eat only half. Ask for a box at the beginning of your meal and put the rest of your entree in it so you are not tempted to eat it.  · If calories are listed on the menu, choose the lower calorie options.  · Choose dishes that include vegetables, fruits, whole grains, low-fat dairy products, and lean protein.  · Choose items that are boiled, broiled, grilled, or steamed. Stay away from items that are buttered, battered, fried, or served with cream sauce. Items labeled “crispy” are usually fried, unless stated otherwise.  · Choose water, low-fat milk, unsweetened iced tea, or other drinks without added sugar. If you want an alcoholic beverage, choose a lower calorie option such as a glass of wine or light beer.  · Ask for dressings, sauces, and syrups on the side. These are usually high in calories, so you should limit the amount you eat.  · If you want a salad, choose a garden salad and ask for grilled meats. Avoid extra toppings like lion, cheese, or fried items. Ask for the dressing on the side, or ask for olive oil and vinegar or lemon to use as dressing.  · Estimate how many servings of a food you are given. For example, a serving of cooked rice is ½ cup or about the size of half a baseball. Knowing serving sizes will help you be aware of how much food you are eating at restaurants. The list below tells you how big or small some common portion sizes are based on everyday objects:  ¨ 1 oz--4 stacked dice.  ¨ 3 oz--1 deck of cards.  ¨ 1 tsp--1 die.  ¨ 1 Tbsp--½ a ping-pong ball.  ¨ 2 Tbsp--1 ping-pong ball.  ¨ ½ cup--½ baseball.  ¨ 1 cup--1 baseball.  Summary  · Calorie counting means keeping track of how many calories you eat and drink each day. If you eat fewer calories than your body needs, you should lose weight.  · A healthy amount of weight to lose per week is usually 1-2 lb (0.5-0.9 kg). This usually means reducing your daily calorie intake by 500-750  calories.  · The number of calories in a food can be found on a Nutrition Facts label. If a food does not have a Nutrition Facts label, try to look up the calories online or ask your dietitian for help.  · Use your calories on foods and drinks that will fill you up, and not on foods and drinks that will leave you hungry.  · Use smaller plates, glasses, and bowls to prevent overeating.  This information is not intended to replace advice given to you by your health care provider. Make sure you discuss any questions you have with your health care provider.  Document Released: 12/18/2006 Document Revised: 11/17/2017 Document Reviewed: 11/17/2017  [employer] Interactive Patient Education © 2017 [employer]

## 2018-04-09 RX ORDER — TOPIRAMATE 50 MG/1
TABLET, FILM COATED ORAL
Qty: 90 TABLET | Refills: 11 | Status: SHIPPED | OUTPATIENT
Start: 2018-04-09 | End: 2019-07-23 | Stop reason: SDUPTHER

## 2018-06-12 ENCOUNTER — OFFICE VISIT (OUTPATIENT)
Dept: FAMILY MEDICINE CLINIC | Facility: CLINIC | Age: 58
End: 2018-06-12

## 2018-06-12 VITALS
BODY MASS INDEX: 31.99 KG/M2 | TEMPERATURE: 97.7 F | SYSTOLIC BLOOD PRESSURE: 106 MMHG | HEART RATE: 80 BPM | HEIGHT: 64 IN | WEIGHT: 187.4 LBS | DIASTOLIC BLOOD PRESSURE: 70 MMHG

## 2018-06-12 DIAGNOSIS — L03.119 CELLULITIS OF LOWER EXTREMITY, UNSPECIFIED LATERALITY: ICD-10-CM

## 2018-06-12 DIAGNOSIS — L23.7 ALLERGIC CONTACT DERMATITIS DUE TO PLANTS, EXCEPT FOOD: Primary | ICD-10-CM

## 2018-06-12 DIAGNOSIS — I87.2 CHRONIC VENOUS INSUFFICIENCY: Chronic | ICD-10-CM

## 2018-06-12 PROCEDURE — 99213 OFFICE O/P EST LOW 20 MIN: CPT | Performed by: INTERNAL MEDICINE

## 2018-06-12 PROCEDURE — 96372 THER/PROPH/DIAG INJ SC/IM: CPT | Performed by: INTERNAL MEDICINE

## 2018-06-12 RX ORDER — DOXYCYCLINE HYCLATE 100 MG/1
100 CAPSULE ORAL 2 TIMES DAILY
Qty: 14 CAPSULE | Refills: 0 | Status: SHIPPED | OUTPATIENT
Start: 2018-06-12 | End: 2018-06-19

## 2018-06-12 RX ORDER — TRIAMCINOLONE ACETONIDE 1 MG/G
CREAM TOPICAL 3 TIMES DAILY
Qty: 80 G | Refills: 0 | Status: SHIPPED | OUTPATIENT
Start: 2018-06-12

## 2018-06-12 RX ORDER — TRIAMCINOLONE ACETONIDE 40 MG/ML
20 INJECTION, SUSPENSION INTRA-ARTICULAR; INTRAMUSCULAR ONCE
Status: COMPLETED | OUTPATIENT
Start: 2018-06-12 | End: 2018-06-12

## 2018-06-12 RX ORDER — PREDNISONE 10 MG/1
TABLET ORAL
Qty: 24 TABLET | Refills: 0 | Status: SHIPPED | OUTPATIENT
Start: 2018-06-12 | End: 2018-09-10

## 2018-06-12 RX ADMIN — TRIAMCINOLONE ACETONIDE 20 MG: 40 INJECTION, SUSPENSION INTRA-ARTICULAR; INTRAMUSCULAR at 08:49

## 2018-06-12 NOTE — PROGRESS NOTES
"Subjective        History of Present Illness     Alexa Luna is a 57 y.o. female who comes in today reporting painful, pruritic skin rash mostly affecting upper and lower extremities.  Onset of rash initially appeared on the left thigh approximately ten days ago.  She denies being in hot tub or swimming pool, but had been outdoors doing some weeding prior to the eruption of rash.   She has applied Calamine lotion for symptomatic relief with minimal results.  She denies swelling of the lips or tongue.  She had one episode where she reports feeling a little difficulty breathing, but this was an isolated episode, and she has not experienced any further episodes.        She reports constant aching of her legs consistent with early symptoms of chronic venous insuffciency.  She works as a  and is weight bearing for a good bit of the day.  I recommended support hose and elevation of lower extremities when possible.  Weight loss would also be beneficial.         Review of Systems   Constitutional: Negative for chills, fatigue and fever.   HENT: Negative for congestion, ear pain, postnasal drip, sinus pressure and sore throat.    Respiratory: Negative for cough, shortness of breath and wheezing.    Cardiovascular: Negative for chest pain, palpitations and leg swelling.   Gastrointestinal: Negative for abdominal pain, blood in stool, constipation, diarrhea, nausea and vomiting.   Endocrine: Negative for cold intolerance, heat intolerance, polydipsia and polyuria.   Genitourinary: Negative for dysuria, frequency, hematuria and urgency.   Skin: Positive for rash.   Neurological: Negative for syncope and weakness.        Objective     Vitals:    06/12/18 0818   BP: 106/70   Pulse: 80   Temp: 97.7 °F (36.5 °C)   TempSrc: Oral   Weight: 85 kg (187 lb 6.4 oz)   Height: 162.6 cm (64\")     Physical Exam   Constitutional: She is oriented to person, place, and time. She appears well-developed and well-nourished. No " distress.   HENT:   Head: Normocephalic and atraumatic.   Nose: Nose normal.   Mouth/Throat: Oropharynx is clear and moist. No oropharyngeal exudate.   No swelling of the lips or tongue.    Eyes: EOM are normal. Pupils are equal, round, and reactive to light.   Neck: Neck supple. No JVD present. No thyromegaly present.   Cardiovascular: Normal rate, regular rhythm and normal heart sounds.    Pulmonary/Chest: Effort normal and breath sounds normal. No accessory muscle usage. No respiratory distress. She has no wheezes. She has no rales.   No stridor.    Abdominal: Soft. Bowel sounds are normal. She exhibits no distension. There is no tenderness.   Musculoskeletal: She exhibits no edema.   Lymphadenopathy:     She has no cervical adenopathy.   Neurological: She is alert and oriented to person, place, and time. No cranial nerve deficit.   Psychiatric: She has a normal mood and affect. Her speech is normal and behavior is normal. Judgment and thought content normal.     Future Appointments  Date Time Provider Department Center   2/13/2019 3:00 PM Scot Abbasi MD [facility] None       Assessment/Plan      A prescription is sent for doxycycline 100 mg to take one capsule b.i.d. X 7 days, prednisone 10 mg taper to use as directed, and Kenalog 0.1% cream to apply  topically 3 (Three) Times a Day for pruritis.  Recommended OTC generic Zyrtec 10 mg to take one tablet daily.  She can take Benadryl at night to help relief symptoms to help with sleep.  She can continue to apply the Calamine lotion after applying the Kenalog cream.     After informed verbal consent, patient is given Kenalog 20 mg IM without difficulty or complications.  Patient tolerated well without complications.     For the early symptoms of chronic venous insufficiency, I recommended use of compression stockings, elevation, sodium restriction, and weight loss       Continue prescription medications as prescribed.  Return in February for follow up with  fasting labs one week prior.     Scribed for Dr. Abbasi by Kathryn Sweeney [facility].     Diagnoses and all orders for this visit:    Allergic contact dermatitis due to plants, except food  -     triamcinolone acetonide (KENALOG-40) injection 20 mg; Inject 0.5 mL into the shoulder, thigh, or buttocks 1 (One) Time.    Cellulitis of lower extremity, unspecified laterality  -     triamcinolone acetonide (KENALOG-40) injection 20 mg; Inject 0.5 mL into the shoulder, thigh, or buttocks 1 (One) Time.    Chronic venous insufficiency    Other orders  -     predniSONE (DELTASONE) 10 MG tablet; 1 by mouth 3 times a day times 4 days, then 1 by mouth twice a day times 4 days, then 1 by mouth daily times 4 days  -     triamcinolone (KENALOG) 0.1 % cream; Apply  topically 3 (Three) Times a Day. For itching  -     doxycycline (VIBRAMYCIN) 100 MG capsule; Take 1 capsule by mouth 2 (Two) Times a Day for 7 days.        No visits with results within 3 Week(s) from this visit.   Latest known visit with results is:   Lab on 02/06/2018   Component Date Value Ref Range Status   • Total Cholesterol 02/06/2018 201* 150 - 200 mg/dL Final   • Triglycerides 02/06/2018 117  35 - 160 mg/dL Final   • HDL Cholesterol 02/06/2018 49  35 - 100 mg/dL Final   • LDL Cholesterol  02/06/2018 129  mg/dL Final   • VLDL Cholesterol 02/06/2018 23.4  mg/dL Final   • LDL/HDL Ratio 02/06/2018 2.62   Final   • 25 Hydroxy, Vitamin D 02/06/2018 48.2  30.0 - 100.0 ng/ml Final   • TSH 02/06/2018 3.170  0.460 - 4.680 mIU/mL Final   • Glucose 02/06/2018 87  70 - 100 mg/dL Final   • BUN 02/06/2018 14  8 - 25 mg/dL Final   • Creatinine 02/06/2018 1.00  0.40 - 1.30 mg/dL Final   • Sodium 02/06/2018 141  134 - 146 mmol/L Final   • Potassium 02/06/2018 3.5  3.4 - 5.4 mmol/L Final   • Chloride 02/06/2018 108  100 - 112 mmol/L Final   • CO2 02/06/2018 25.0  20.0 - 32.0 mmol/L Final   • Calcium 02/06/2018 9.2  8.4 - 10.8 mg/dL Final   • Total Protein 02/06/2018 6.9  6.7 - 8.2  g/dL Final   • Albumin 02/06/2018 3.90  3.20 - 5.50 g/dL Final   • ALT (SGPT) 02/06/2018 16  10 - 60 U/L Final   • AST (SGOT) 02/06/2018 25  10 - 60 U/L Final   • Alkaline Phosphatase 02/06/2018 39  15 - 121 U/L Final   • Total Bilirubin 02/06/2018 0.6  0.2 - 1.0 mg/dL Final   • eGFR Non African Amer 02/06/2018 57  51 - 120 mL/min/1.73 Final   • Globulin 02/06/2018 3.0  2.5 - 4.6 gm/dL Final   • A/G Ratio 02/06/2018 1.3  1.0 - 3.0 g/dL Final   • BUN/Creatinine Ratio 02/06/2018 14.0  7.0 - 25.0 Final   • Anion Gap 02/06/2018 8.0  5.0 - 15.0 mmol/L Final   • WBC 02/06/2018 6.41  3.20 - 9.80 10*3/mm3 Final   • RBC 02/06/2018 4.77  3.77 - 5.16 10*6/mm3 Final   • Hemoglobin 02/06/2018 14.1  12.0 - 15.5 g/dL Final   • Hematocrit 02/06/2018 41.4  35.0 - 45.0 % Final   • MCV 02/06/2018 86.8  80.0 - 98.0 fL Final   • MCH 02/06/2018 29.6  26.5 - 34.0 pg Final   • MCHC 02/06/2018 34.1  31.4 - 36.0 g/dL Final   • RDW 02/06/2018 13.6  11.5 - 14.5 % Final   • RDW-SD 02/06/2018 42.2  36.4 - 46.3 fl Final   • MPV 02/06/2018 9.6  8.0 - 12.0 fL Final   • Platelets 02/06/2018 231  150 - 450 10*3/mm3 Final   • Neutrophil % 02/06/2018 47.6  37.0 - 80.0 % Final   • Lymphocyte % 02/06/2018 41.3  10.0 - 50.0 % Final   • Monocyte % 02/06/2018 5.8  0.0 - 12.0 % Final   • Eosinophil % 02/06/2018 4.8  0.0 - 7.0 % Final   • Basophil % 02/06/2018 0.5  0.0 - 2.0 % Final   • Neutrophils, Absolute 02/06/2018 3.05  2.00 - 8.60 10*3/mm3 Final   • Lymphocytes, Absolute 02/06/2018 2.65  0.60 - 4.20 10*3/mm3 Final   • Monocytes, Absolute 02/06/2018 0.37  0.00 - 0.90 10*3/mm3 Final   • Eosinophils, Absolute 02/06/2018 0.31  0.00 - 0.70 10*3/mm3 Final   • Basophils, Absolute 02/06/2018 0.03  0.00 - 0.20 10*3/mm3 Final   ]

## 2018-09-10 ENCOUNTER — OFFICE VISIT (OUTPATIENT)
Dept: FAMILY MEDICINE CLINIC | Facility: CLINIC | Age: 58
End: 2018-09-10

## 2018-09-10 VITALS
BODY MASS INDEX: 33.6 KG/M2 | DIASTOLIC BLOOD PRESSURE: 80 MMHG | TEMPERATURE: 97.8 F | WEIGHT: 196.8 LBS | HEART RATE: 63 BPM | HEIGHT: 64 IN | SYSTOLIC BLOOD PRESSURE: 110 MMHG | OXYGEN SATURATION: 98 %

## 2018-09-10 DIAGNOSIS — L29.0 PRURITUS ANI: ICD-10-CM

## 2018-09-10 DIAGNOSIS — G47.9 SLEEP DISORDER: Chronic | ICD-10-CM

## 2018-09-10 DIAGNOSIS — J06.9 VIRAL URI WITH COUGH: Primary | ICD-10-CM

## 2018-09-10 DIAGNOSIS — K64.4 EXTERNAL HEMORRHOIDS: Chronic | ICD-10-CM

## 2018-09-10 DIAGNOSIS — J45.21 MILD INTERMITTENT ASTHMA WITH ACUTE EXACERBATION: ICD-10-CM

## 2018-09-10 PROCEDURE — 99214 OFFICE O/P EST MOD 30 MIN: CPT | Performed by: INTERNAL MEDICINE

## 2018-09-10 PROCEDURE — 96372 THER/PROPH/DIAG INJ SC/IM: CPT | Performed by: INTERNAL MEDICINE

## 2018-09-10 RX ORDER — TRIAMCINOLONE ACETONIDE 40 MG/ML
20 INJECTION, SUSPENSION INTRA-ARTICULAR; INTRAMUSCULAR ONCE
Status: COMPLETED | OUTPATIENT
Start: 2018-09-10 | End: 2018-09-10

## 2018-09-10 RX ORDER — METHYLPREDNISOLONE ACETATE 80 MG/ML
80 INJECTION, SUSPENSION INTRA-ARTICULAR; INTRALESIONAL; INTRAMUSCULAR; SOFT TISSUE ONCE
Status: COMPLETED | OUTPATIENT
Start: 2018-09-10 | End: 2018-09-10

## 2018-09-10 RX ORDER — ZOLPIDEM TARTRATE 10 MG/1
10 TABLET ORAL NIGHTLY PRN
Qty: 90 TABLET | Refills: 1 | Status: SHIPPED | OUTPATIENT
Start: 2018-09-10 | End: 2019-02-04 | Stop reason: SDUPTHER

## 2018-09-10 RX ADMIN — METHYLPREDNISOLONE ACETATE 80 MG: 80 INJECTION, SUSPENSION INTRA-ARTICULAR; INTRALESIONAL; INTRAMUSCULAR; SOFT TISSUE at 14:21

## 2018-09-10 RX ADMIN — TRIAMCINOLONE ACETONIDE 20 MG: 40 INJECTION, SUSPENSION INTRA-ARTICULAR; INTRAMUSCULAR at 14:22

## 2018-09-10 NOTE — PROGRESS NOTES
Subjective        History of Present Illness     Alexa Luna is a 58 y.o. female who comes in today reporting onset of upper respiratory symptoms approximately a week ago.  Initial symptoms included frequent nonproductive cough, rhinitis, and postnasal drainage.  The cough has progressed to a paroxysmal cough with mild shortness of breath at times.  She is also having clear nasal drainage and pharyngitis.  She is not recognizing wheezes, but I hear wheezes on exam.  She has not required use of inhalers in the past.  She denies sick contacts that she is aware of.  She is taking Xyzal and using nasal steroid spray one spray each nostril daily.  Symptoms are consistent with viral URI with mild asthma exacerbation.    She also has constant/frequent rectal itching and is concerned about hemorrhoids.  Denies pain.  Exam reveals external hemorrhoids at 3 and 9 o'clock.  No evidence of parasite infection.  No thrombosed hemorrhoids.  She has had some minimal improvement with Preparation H.  I recommended use of OTC  witch hazel wipes and applying a thin layer of Preparation H.  She struggles with constipation.  I recommended daily use of Citrucel daily to help with the constipation.     She continues to require Ambien rather frequently at night in order to sleep.  She denies any significant adverse side effects such as sleepwalking or excessive sedation the next day.     Review of Systems   Constitutional: Negative for chills, fatigue and fever.   HENT: Positive for congestion. Negative for ear pain, postnasal drip, sinus pressure and sore throat.    Respiratory: Positive for cough. Negative for shortness of breath and wheezing.    Cardiovascular: Negative for chest pain, palpitations and leg swelling.   Gastrointestinal: Negative for abdominal pain, blood in stool, constipation, diarrhea, nausea and vomiting.   Endocrine: Negative for cold intolerance, heat intolerance, polydipsia and polyuria.   Genitourinary: Negative  "for dysuria, frequency, hematuria and urgency.        Rectal itching   Skin: Negative for rash.   Neurological: Negative for syncope and weakness.        Objective     Vitals:    09/10/18 1304   BP: 110/80   Pulse: 63   Temp: 97.8 °F (36.6 °C)   TempSrc: Oral   SpO2: 98%   Weight: 89.3 kg (196 lb 12.8 oz)   Height: 162.6 cm (64\")     Physical Exam   Constitutional: She is oriented to person, place, and time. She appears well-developed and well-nourished. No distress.   HENT:   Head: Normocephalic and atraumatic.   Nose: Nose normal.   Mouth/Throat: Oropharynx is clear and moist. No oropharyngeal exudate.   Clear postnasal drip and mild posterior erythema on the right       Eyes: Pupils are equal, round, and reactive to light. EOM are normal.   Neck: Neck supple. No JVD present. No thyromegaly present.   Cardiovascular: Normal rate, regular rhythm and normal heart sounds.    Pulmonary/Chest: Effort normal and breath sounds normal. No accessory muscle usage. No respiratory distress. She has no wheezes. She has no rales.   Slightly diminihed air movement and prominent expiratory wheezes.    Abdominal: Soft. Bowel sounds are normal. She exhibits no distension. There is no tenderness.   Genitourinary:   Genitourinary Comments: Rectal exam reveals eternal hemorrhoids at 3 and 9 o'clock.  Digital rectal exam with Kathryn presents is unremarkable.  No evidence of parasite infection   Musculoskeletal: She exhibits no edema.   Lymphadenopathy:     She has no cervical adenopathy.   Neurological: She is alert and oriented to person, place, and time. No cranial nerve deficit.   Psychiatric: She has a normal mood and affect. Her speech is normal and behavior is normal. Judgment and thought content normal.     Future Appointments  Date Time Provider Department Center   2/13/2019 3:00 PM Scot Abbasi MD [facility] None       Assessment/Plan    The URI symptoms, asthma wheezes, and hemorrhoidal itching are all new " problems.    Symptoms consistent with viral URI with asthma exacerbation.  No antibiotics are indicated currently.   Continue the Xyzal daily.  Increase the OTC nasal steroid spray to use one spray each nostril b.i.d.  Sample is given for María Elenao to use one inhalation daily.    Appropriate directions for use demonstrated today.  Patient is given Kenalog 20 and Depo-Medrol 80 mg IM without difficulty or complications.  She tolerated well.   Notify me if symtoms do not resolve or she develops symptoms of bacterial infection including fever or colored secretions.      For the external hemorrhoids, I recommended OTC cleaning the rectum with witch hazel wipes and applying a thin layer of Preparation H.  She struggles with constipation.  I recommended daily use of Citrucel daily to help with the constipation.  She can apply OTC hydrocortisone cream occasionally for severe itching.     Refill the Ambien. Patient understands the risks associated with this controlled medication, including tolerance and addiction.  she also agrees to only obtain this medication from me, and not from a another provider, unless that provider is covering for me in my absence.  she also agrees to be compliant in dosing, and not self adjust the dose of medication.  A signed controlled substance agreement is on file, and she has received a controlled substance education sheet at this a previous visit.  she has also signed a consent for treatment with a controlled substance as per [facility] policy. WENDY was obtained.      Return in February for routine follow up with fasting labs one week prior.     Scribed for Dr. Abbasi by Kathryn Sweeney [facility].     Diagnoses and all orders for this visit:    Viral URI with cough    Mild intermittent asthma with acute exacerbation  -     methylPREDNISolone acetate (DEPO-medrol) injection 80 mg; Inject 1 mL into the appropriate muscle as directed by prescriber 1 (One) Time.  -     triamcinolone acetonide  (KENALOG-40) injection 20 mg; Inject 0.5 mL into the appropriate muscle as directed by prescriber 1 (One) Time.    External hemorrhoids    Pruritus ani    Sleep disorder    Other orders  -     zolpidem (AMBIEN) 10 MG tablet; Take 1 tablet by mouth At Night As Needed for Sleep. 1/2 to 1 by mouth every night as needed for sleep.  -     methylcellulose (CITRUCEL) oral powder; Take 2 application by mouth Daily.        No visits with results within 3 Week(s) from this visit.   Latest known visit with results is:   Lab on 02/06/2018   Component Date Value Ref Range Status   • Total Cholesterol 02/06/2018 201* 150 - 200 mg/dL Final   • Triglycerides 02/06/2018 117  35 - 160 mg/dL Final   • HDL Cholesterol 02/06/2018 49  35 - 100 mg/dL Final   • LDL Cholesterol  02/06/2018 129  mg/dL Final   • VLDL Cholesterol 02/06/2018 23.4  mg/dL Final   • LDL/HDL Ratio 02/06/2018 2.62   Final   • 25 Hydroxy, Vitamin D 02/06/2018 48.2  30.0 - 100.0 ng/ml Final   • TSH 02/06/2018 3.170  0.460 - 4.680 mIU/mL Final   • Glucose 02/06/2018 87  70 - 100 mg/dL Final   • BUN 02/06/2018 14  8 - 25 mg/dL Final   • Creatinine 02/06/2018 1.00  0.40 - 1.30 mg/dL Final   • Sodium 02/06/2018 141  134 - 146 mmol/L Final   • Potassium 02/06/2018 3.5  3.4 - 5.4 mmol/L Final   • Chloride 02/06/2018 108  100 - 112 mmol/L Final   • CO2 02/06/2018 25.0  20.0 - 32.0 mmol/L Final   • Calcium 02/06/2018 9.2  8.4 - 10.8 mg/dL Final   • Total Protein 02/06/2018 6.9  6.7 - 8.2 g/dL Final   • Albumin 02/06/2018 3.90  3.20 - 5.50 g/dL Final   • ALT (SGPT) 02/06/2018 16  10 - 60 U/L Final   • AST (SGOT) 02/06/2018 25  10 - 60 U/L Final   • Alkaline Phosphatase 02/06/2018 39  15 - 121 U/L Final   • Total Bilirubin 02/06/2018 0.6  0.2 - 1.0 mg/dL Final   • eGFR Non African Amer 02/06/2018 57  51 - 120 mL/min/1.73 Final   • Globulin 02/06/2018 3.0  2.5 - 4.6 gm/dL Final   • A/G Ratio 02/06/2018 1.3  1.0 - 3.0 g/dL Final   • BUN/Creatinine Ratio 02/06/2018 14.0  7.0 -  25.0 Final   • Anion Gap 02/06/2018 8.0  5.0 - 15.0 mmol/L Final   • WBC 02/06/2018 6.41  3.20 - 9.80 10*3/mm3 Final   • RBC 02/06/2018 4.77  3.77 - 5.16 10*6/mm3 Final   • Hemoglobin 02/06/2018 14.1  12.0 - 15.5 g/dL Final   • Hematocrit 02/06/2018 41.4  35.0 - 45.0 % Final   • MCV 02/06/2018 86.8  80.0 - 98.0 fL Final   • MCH 02/06/2018 29.6  26.5 - 34.0 pg Final   • MCHC 02/06/2018 34.1  31.4 - 36.0 g/dL Final   • RDW 02/06/2018 13.6  11.5 - 14.5 % Final   • RDW-SD 02/06/2018 42.2  36.4 - 46.3 fl Final   • MPV 02/06/2018 9.6  8.0 - 12.0 fL Final   • Platelets 02/06/2018 231  150 - 450 10*3/mm3 Final   • Neutrophil % 02/06/2018 47.6  37.0 - 80.0 % Final   • Lymphocyte % 02/06/2018 41.3  10.0 - 50.0 % Final   • Monocyte % 02/06/2018 5.8  0.0 - 12.0 % Final   • Eosinophil % 02/06/2018 4.8  0.0 - 7.0 % Final   • Basophil % 02/06/2018 0.5  0.0 - 2.0 % Final   • Neutrophils, Absolute 02/06/2018 3.05  2.00 - 8.60 10*3/mm3 Final   • Lymphocytes, Absolute 02/06/2018 2.65  0.60 - 4.20 10*3/mm3 Final   • Monocytes, Absolute 02/06/2018 0.37  0.00 - 0.90 10*3/mm3 Final   • Eosinophils, Absolute 02/06/2018 0.31  0.00 - 0.70 10*3/mm3 Final   • Basophils, Absolute 02/06/2018 0.03  0.00 - 0.20 10*3/mm3 Final   ]

## 2018-12-26 RX ORDER — OMEPRAZOLE 20 MG/1
CAPSULE, DELAYED RELEASE ORAL
Qty: 90 CAPSULE | Refills: 3 | Status: SHIPPED | OUTPATIENT
Start: 2018-12-26 | End: 2019-07-23 | Stop reason: SDUPTHER

## 2019-02-04 RX ORDER — ZOLPIDEM TARTRATE 10 MG/1
10 TABLET ORAL NIGHTLY PRN
Qty: 30 TABLET | Refills: 0 | Status: SHIPPED | OUTPATIENT
Start: 2019-02-04 | End: 2019-03-11 | Stop reason: SDUPTHER

## 2019-02-13 ENCOUNTER — OFFICE VISIT (OUTPATIENT)
Dept: FAMILY MEDICINE CLINIC | Facility: CLINIC | Age: 59
End: 2019-02-13

## 2019-02-13 VITALS
TEMPERATURE: 98 F | HEART RATE: 92 BPM | SYSTOLIC BLOOD PRESSURE: 110 MMHG | DIASTOLIC BLOOD PRESSURE: 80 MMHG | WEIGHT: 196 LBS | HEIGHT: 64 IN | BODY MASS INDEX: 33.46 KG/M2

## 2019-02-13 DIAGNOSIS — I95.81 POSTPROCEDURAL HYPOTENSION: ICD-10-CM

## 2019-02-13 DIAGNOSIS — R00.2 PALPITATIONS: Chronic | ICD-10-CM

## 2019-02-13 DIAGNOSIS — E66.09 CLASS 1 OBESITY DUE TO EXCESS CALORIES WITH SERIOUS COMORBIDITY AND BODY MASS INDEX (BMI) OF 33.0 TO 33.9 IN ADULT: Chronic | ICD-10-CM

## 2019-02-13 DIAGNOSIS — R00.1 DRUG-INDUCED BRADYCARDIA: Primary | ICD-10-CM

## 2019-02-13 DIAGNOSIS — M17.0 PRIMARY OSTEOARTHRITIS OF BOTH KNEES: Chronic | ICD-10-CM

## 2019-02-13 DIAGNOSIS — R53.83 FATIGUE, UNSPECIFIED TYPE: ICD-10-CM

## 2019-02-13 DIAGNOSIS — E78.2 MIXED HYPERLIPIDEMIA: Chronic | ICD-10-CM

## 2019-02-13 DIAGNOSIS — T50.905A DRUG-INDUCED BRADYCARDIA: Primary | ICD-10-CM

## 2019-02-13 DIAGNOSIS — Z09 HOSPITAL DISCHARGE FOLLOW-UP: ICD-10-CM

## 2019-02-13 DIAGNOSIS — E55.9 VITAMIN D DEFICIENCY: Chronic | ICD-10-CM

## 2019-02-13 PROCEDURE — 99214 OFFICE O/P EST MOD 30 MIN: CPT | Performed by: INTERNAL MEDICINE

## 2019-02-13 RX ORDER — OXYCODONE HYDROCHLORIDE AND ACETAMINOPHEN 5; 325 MG/1; MG/1
1 TABLET ORAL ONCE AS NEEDED
COMMUNITY
End: 2019-05-31

## 2019-02-13 RX ORDER — TRAMADOL HYDROCHLORIDE 50 MG/1
50 TABLET ORAL EVERY 6 HOURS PRN
COMMUNITY

## 2019-02-13 RX ORDER — MONTELUKAST SODIUM 10 MG/1
1 TABLET ORAL DAILY
Refills: 1 | COMMUNITY
Start: 2019-01-28 | End: 2019-06-28 | Stop reason: SDUPTHER

## 2019-02-13 NOTE — PROGRESS NOTES
Subjective        History of Present Illness     Alexa Luna is a 58 y.o. female who had left total knee replacement surgery at [facility] in [facility] on February 5th, 2018 and reports hypotensive and bradycardic episodes while hospitalized.  They recommended she hold the metoprolol, which she has done.  She is participating in physical therapy three days weekly at [facility] and completing home exercises as instructed.  Exam reveals 105 degrees of flexion with patient about 10 degrees away from full extension.  Her incision site is healing nicely with no evidence of wound dehiscence or infection.  She continues on narcotic pain medication three times daily and stool softeners to prevent narcotic induced constipation.   She plans to have right knee replacement surgery in a few weeks.     Without the metoprolol, blood pressure is at goal.  However, heart rate is a little elevated at 92.  On exam, resting heart rate is 96.  She was given metoprolol in the past to help deal with palpitations and tendencies towards mildly rapid heart rate.  She does not have a blood pressure monitor at home, but is willing to purchase one today.    We reviewed goals of calorie reduction now and increasing physical activity when she gets both knees replaced.  We discussed the benefits of weight loss regarding her sleep apnea and osteoarthritis, among other benefits.  She voices agreement and understanding.    Review of Systems   Constitutional: Negative for chills, fatigue and fever.   HENT: Negative for congestion, ear pain, postnasal drip, sinus pressure and sore throat.    Respiratory: Negative for cough, shortness of breath and wheezing.    Cardiovascular: Negative for chest pain, palpitations and leg swelling.   Gastrointestinal: Negative for abdominal pain, blood in stool, constipation, diarrhea, nausea and vomiting.   Endocrine: Negative for cold intolerance, heat intolerance, polydipsia and polyuria.  "  Genitourinary: Negative for dysuria, frequency, hematuria and urgency.   Skin: Negative for rash.   Neurological: Negative for syncope and weakness.      Objective     Vitals:    02/13/19 1525   BP: 110/80   Pulse: 92   Temp: 98 °F (36.7 °C)   TempSrc: Oral   Weight: 88.9 kg (196 lb)   Height: 162.6 cm (64\")       Physical Exam   Constitutional: She is oriented to person, place, and time. She appears well-developed and well-nourished. No distress.   Obese female. Ambulating on a walker.    HENT:   Head: Normocephalic and atraumatic.   Nose: Right sinus exhibits no maxillary sinus tenderness and no frontal sinus tenderness. Left sinus exhibits no maxillary sinus tenderness and no frontal sinus tenderness.   Mouth/Throat: Uvula is midline, oropharynx is clear and moist and mucous membranes are normal. No oral lesions. No tonsillar exudate.   Eyes: Conjunctivae and EOM are normal. Pupils are equal, round, and reactive to light.   Neck: Trachea normal. Neck supple. No JVD present. Carotid bruit is not present. No tracheal deviation present. No thyroid mass and no thyromegaly present.   Cardiovascular: Normal rate, regular rhythm, normal heart sounds and intact distal pulses.  No extrasystoles are present. PMI is not displaced.   No murmur heard.  Resting heart rate 96.    Pulmonary/Chest: Effort normal and breath sounds normal. No accessory muscle usage. No respiratory distress. She has no decreased breath sounds. She has no wheezes. She has no rhonchi. She has no rales.   Abdominal: Soft. Bowel sounds are normal. She exhibits no distension. There is no hepatosplenomegaly. There is no tenderness.   Obese abdomen.    Musculoskeletal:   Incision from recent total left knee replacement is healing well.  Exam reveals 105 degrees of flexion with patient about 10 degrees away from full extension.       Vascular Status -  Her right foot exhibits normal foot vasculature  and no edema. Her left foot exhibits normal foot " vasculature  and no edema.  Lymphadenopathy:     She has no cervical adenopathy.   Neurological: She is alert and oriented to person, place, and time. No cranial nerve deficit. Coordination normal.   Skin: Skin is warm, dry and intact. No rash noted. No cyanosis. Nails show no clubbing.   Psychiatric: She has a normal mood and affect. Her speech is normal and behavior is normal. Judgment and thought content normal.   Vitals reviewed.        Assessment/Plan      Her incision from total left knee replacement surgery last week is healing well.  Continue with physical therapy and home exercises to improve range of motion.      Blood pressure at goal today, likely aided by the current use of narcotics for pain relief.  She can stay off of the metoprolol for now.  I recommended she purchase a blood pressure monitor to monitor BP and heart rate.  I asked her to keep a diary.  She has the metoprolol at home, which she can use for episodes of tachycardia or increased palpitations.   Notify me if BP and heart rate are not consisently at goal, or palpitations worse.      We encouraged aggressive weight loss.  See above.    Return in four weeks for routine follow up exam with fasting labs one week prior.     Current outpatient and discharge medications have been reconciled for the patient.  Reviewed by: Scot Abbasi MD      Scribed for Dr. Abbasi by Kathryn Sweeney [facility].     Diagnoses and all orders for this visit:    Drug-induced bradycardia    Postprocedural hypotension    Palpitations  -     Comprehensive Metabolic Panel; Future  -     TSH; Future    Class 1 obesity due to excess calories with serious comorbidity and body mass index (BMI) of 33.0 to 33.9 in adult    Primary osteoarthritis of both knees    Hospital discharge follow-up    Mixed hyperlipidemia  -     CBC Auto Differential; Future  -     Comprehensive Metabolic Panel; Future  -     Lipid Panel; Future    Vitamin D deficiency  -     Vitamin D 25 Hydroxy;  Future    Fatigue, unspecified type  -     CBC Auto Differential; Future  -     Comprehensive Metabolic Panel; Future  -     TSH; Future  -     Vitamin B12; Future    Other orders  -     montelukast (SINGULAIR) 10 MG tablet; Take 1 tablet by mouth Daily.  -     traMADol (ULTRAM) 50 MG tablet; Take 50 mg by mouth Every 6 (Six) Hours As Needed for Moderate Pain .  -     oxyCODONE-acetaminophen (PERCOCET) 5-325 MG per tablet; Take 1 tablet by mouth 1 (One) Time As Needed.        No visits with results within 3 Week(s) from this visit.   Latest known visit with results is:   Lab on 02/06/2018   Component Date Value Ref Range Status   • Total Cholesterol 02/06/2018 201* 150 - 200 mg/dL Final   • Triglycerides 02/06/2018 117  35 - 160 mg/dL Final   • HDL Cholesterol 02/06/2018 49  35 - 100 mg/dL Final   • LDL Cholesterol  02/06/2018 129  mg/dL Final   • VLDL Cholesterol 02/06/2018 23.4  mg/dL Final   • LDL/HDL Ratio 02/06/2018 2.62   Final   • 25 Hydroxy, Vitamin D 02/06/2018 48.2  30.0 - 100.0 ng/ml Final   • TSH 02/06/2018 3.170  0.460 - 4.680 mIU/mL Final   • Glucose 02/06/2018 87  70 - 100 mg/dL Final   • BUN 02/06/2018 14  8 - 25 mg/dL Final   • Creatinine 02/06/2018 1.00  0.40 - 1.30 mg/dL Final   • Sodium 02/06/2018 141  134 - 146 mmol/L Final   • Potassium 02/06/2018 3.5  3.4 - 5.4 mmol/L Final   • Chloride 02/06/2018 108  100 - 112 mmol/L Final   • CO2 02/06/2018 25.0  20.0 - 32.0 mmol/L Final   • Calcium 02/06/2018 9.2  8.4 - 10.8 mg/dL Final   • Total Protein 02/06/2018 6.9  6.7 - 8.2 g/dL Final   • Albumin 02/06/2018 3.90  3.20 - 5.50 g/dL Final   • ALT (SGPT) 02/06/2018 16  10 - 60 U/L Final   • AST (SGOT) 02/06/2018 25  10 - 60 U/L Final   • Alkaline Phosphatase 02/06/2018 39  15 - 121 U/L Final   • Total Bilirubin 02/06/2018 0.6  0.2 - 1.0 mg/dL Final   • eGFR Non African Amer 02/06/2018 57  51 - 120 mL/min/1.73 Final   • Globulin 02/06/2018 3.0  2.5 - 4.6 gm/dL Final   • A/G Ratio 02/06/2018 1.3  1.0 - 3.0  g/dL Final   • BUN/Creatinine Ratio 02/06/2018 14.0  7.0 - 25.0 Final   • Anion Gap 02/06/2018 8.0  5.0 - 15.0 mmol/L Final   • WBC 02/06/2018 6.41  3.20 - 9.80 10*3/mm3 Final   • RBC 02/06/2018 4.77  3.77 - 5.16 10*6/mm3 Final   • Hemoglobin 02/06/2018 14.1  12.0 - 15.5 g/dL Final   • Hematocrit 02/06/2018 41.4  35.0 - 45.0 % Final   • MCV 02/06/2018 86.8  80.0 - 98.0 fL Final   • MCH 02/06/2018 29.6  26.5 - 34.0 pg Final   • MCHC 02/06/2018 34.1  31.4 - 36.0 g/dL Final   • RDW 02/06/2018 13.6  11.5 - 14.5 % Final   • RDW-SD 02/06/2018 42.2  36.4 - 46.3 fl Final   • MPV 02/06/2018 9.6  8.0 - 12.0 fL Final   • Platelets 02/06/2018 231  150 - 450 10*3/mm3 Final   • Neutrophil % 02/06/2018 47.6  37.0 - 80.0 % Final   • Lymphocyte % 02/06/2018 41.3  10.0 - 50.0 % Final   • Monocyte % 02/06/2018 5.8  0.0 - 12.0 % Final   • Eosinophil % 02/06/2018 4.8  0.0 - 7.0 % Final   • Basophil % 02/06/2018 0.5  0.0 - 2.0 % Final   • Neutrophils, Absolute 02/06/2018 3.05  2.00 - 8.60 10*3/mm3 Final   • Lymphocytes, Absolute 02/06/2018 2.65  0.60 - 4.20 10*3/mm3 Final   • Monocytes, Absolute 02/06/2018 0.37  0.00 - 0.90 10*3/mm3 Final   • Eosinophils, Absolute 02/06/2018 0.31  0.00 - 0.70 10*3/mm3 Final   • Basophils, Absolute 02/06/2018 0.03  0.00 - 0.20 10*3/mm3 Final   ]

## 2019-03-05 ENCOUNTER — LAB (OUTPATIENT)
Dept: LAB | Facility: OTHER | Age: 59
End: 2019-03-05

## 2019-03-05 DIAGNOSIS — E55.9 VITAMIN D DEFICIENCY: Chronic | ICD-10-CM

## 2019-03-05 DIAGNOSIS — R53.83 FATIGUE, UNSPECIFIED TYPE: ICD-10-CM

## 2019-03-05 DIAGNOSIS — R00.2 PALPITATIONS: Chronic | ICD-10-CM

## 2019-03-05 DIAGNOSIS — E78.2 MIXED HYPERLIPIDEMIA: Chronic | ICD-10-CM

## 2019-03-05 LAB
25(OH)D3 SERPL-MCNC: 36.6 NG/ML (ref 30–100)
ALBUMIN SERPL-MCNC: 4.2 G/DL (ref 3.5–5)
ALBUMIN/GLOB SERPL: 1.3 G/DL (ref 1.1–1.8)
ALP SERPL-CCNC: 50 U/L (ref 38–126)
ALT SERPL W P-5'-P-CCNC: 16 U/L
ANION GAP SERPL CALCULATED.3IONS-SCNC: 10 MMOL/L (ref 5–15)
AST SERPL-CCNC: 25 U/L (ref 14–36)
BASOPHILS # BLD AUTO: 0.04 10*3/MM3 (ref 0–0.2)
BASOPHILS NFR BLD AUTO: 0.6 % (ref 0–2)
BILIRUB SERPL-MCNC: 0.8 MG/DL (ref 0.2–1.3)
BUN BLD-MCNC: 6 MG/DL (ref 7–17)
BUN/CREAT SERPL: 5.7 (ref 7–25)
CALCIUM SPEC-SCNC: 9.5 MG/DL (ref 8.4–10.2)
CHLORIDE SERPL-SCNC: 104 MMOL/L (ref 98–107)
CHOLEST SERPL-MCNC: 168 MG/DL (ref 150–200)
CO2 SERPL-SCNC: 27 MMOL/L (ref 22–30)
CREAT BLD-MCNC: 1.05 MG/DL (ref 0.52–1.04)
DEPRECATED RDW RBC AUTO: 43.1 FL (ref 36.4–46.3)
EOSINOPHIL # BLD AUTO: 0.44 10*3/MM3 (ref 0–0.7)
EOSINOPHIL NFR BLD AUTO: 6.6 % (ref 0–7)
ERYTHROCYTE [DISTWIDTH] IN BLOOD BY AUTOMATED COUNT: 13.6 % (ref 11.5–14.5)
GFR SERPL CREATININE-BSD FRML MDRD: 53 ML/MIN/1.73 (ref 45–104)
GLOBULIN UR ELPH-MCNC: 3.2 GM/DL (ref 2.3–3.5)
GLUCOSE BLD-MCNC: 102 MG/DL (ref 74–99)
HCT VFR BLD AUTO: 41.5 % (ref 35–45)
HDLC SERPL-MCNC: 38 MG/DL (ref 40–59)
HGB BLD-MCNC: 13.3 G/DL (ref 12–15.5)
LDLC SERPL CALC-MCNC: 99 MG/DL
LDLC/HDLC SERPL: 2.61 {RATIO} (ref 0–3.22)
LYMPHOCYTES # BLD AUTO: 3.01 10*3/MM3 (ref 0.6–4.2)
LYMPHOCYTES NFR BLD AUTO: 45.3 % (ref 10–50)
MCH RBC QN AUTO: 28.5 PG (ref 26.5–34)
MCHC RBC AUTO-ENTMCNC: 32 G/DL (ref 31.4–36)
MCV RBC AUTO: 88.9 FL (ref 80–98)
MONOCYTES # BLD AUTO: 0.47 10*3/MM3 (ref 0–0.9)
MONOCYTES NFR BLD AUTO: 7.1 % (ref 0–12)
NEUTROPHILS # BLD AUTO: 2.69 10*3/MM3 (ref 2–8.6)
NEUTROPHILS NFR BLD AUTO: 40.4 % (ref 37–80)
PLATELET # BLD AUTO: 355 10*3/MM3 (ref 150–450)
PMV BLD AUTO: 9.3 FL (ref 8–12)
POTASSIUM BLD-SCNC: 3.9 MMOL/L (ref 3.4–5)
PROT SERPL-MCNC: 7.4 G/DL (ref 6.3–8.2)
RBC # BLD AUTO: 4.67 10*6/MM3 (ref 3.77–5.16)
SODIUM BLD-SCNC: 141 MMOL/L (ref 137–145)
TRIGL SERPL-MCNC: 154 MG/DL
TSH SERPL DL<=0.05 MIU/L-ACNC: 1.94 MIU/ML (ref 0.46–4.68)
VIT B12 BLD-MCNC: 248 PG/ML (ref 239–931)
VLDLC SERPL-MCNC: 30.8 MG/DL
WBC NRBC COR # BLD: 6.65 10*3/MM3 (ref 3.2–9.8)

## 2019-03-05 PROCEDURE — 85025 COMPLETE CBC W/AUTO DIFF WBC: CPT | Performed by: INTERNAL MEDICINE

## 2019-03-05 PROCEDURE — 36415 COLL VENOUS BLD VENIPUNCTURE: CPT | Performed by: INTERNAL MEDICINE

## 2019-03-05 PROCEDURE — 82607 VITAMIN B-12: CPT | Performed by: INTERNAL MEDICINE

## 2019-03-05 PROCEDURE — 82306 VITAMIN D 25 HYDROXY: CPT | Performed by: INTERNAL MEDICINE

## 2019-03-05 PROCEDURE — 80061 LIPID PANEL: CPT | Performed by: INTERNAL MEDICINE

## 2019-03-05 PROCEDURE — 84443 ASSAY THYROID STIM HORMONE: CPT | Performed by: INTERNAL MEDICINE

## 2019-03-05 PROCEDURE — 80053 COMPREHEN METABOLIC PANEL: CPT | Performed by: INTERNAL MEDICINE

## 2019-03-11 ENCOUNTER — OFFICE VISIT (OUTPATIENT)
Dept: FAMILY MEDICINE CLINIC | Facility: CLINIC | Age: 59
End: 2019-03-11

## 2019-03-11 VITALS
HEIGHT: 64 IN | HEART RATE: 88 BPM | WEIGHT: 187.4 LBS | SYSTOLIC BLOOD PRESSURE: 100 MMHG | BODY MASS INDEX: 31.99 KG/M2 | TEMPERATURE: 97.9 F | DIASTOLIC BLOOD PRESSURE: 60 MMHG

## 2019-03-11 DIAGNOSIS — F41.1 GENERALIZED ANXIETY DISORDER: Chronic | ICD-10-CM

## 2019-03-11 DIAGNOSIS — M17.0 PRIMARY OSTEOARTHRITIS OF BOTH KNEES: Chronic | ICD-10-CM

## 2019-03-11 DIAGNOSIS — E78.2 MIXED HYPERLIPIDEMIA: Chronic | ICD-10-CM

## 2019-03-11 DIAGNOSIS — G47.33 OBSTRUCTIVE SLEEP APNEA SYNDROME: Chronic | ICD-10-CM

## 2019-03-11 DIAGNOSIS — I65.23 ATHEROSCLEROSIS OF BOTH CAROTID ARTERIES: Chronic | ICD-10-CM

## 2019-03-11 DIAGNOSIS — E53.8 VITAMIN B12 DEFICIENCY: ICD-10-CM

## 2019-03-11 DIAGNOSIS — G47.9 SLEEP DISORDER: Chronic | ICD-10-CM

## 2019-03-11 DIAGNOSIS — E55.9 VITAMIN D DEFICIENCY: Chronic | ICD-10-CM

## 2019-03-11 DIAGNOSIS — K21.9 GASTROESOPHAGEAL REFLUX DISEASE, ESOPHAGITIS PRESENCE NOT SPECIFIED: Chronic | ICD-10-CM

## 2019-03-11 DIAGNOSIS — E66.09 CLASS 1 OBESITY DUE TO EXCESS CALORIES WITH SERIOUS COMORBIDITY AND BODY MASS INDEX (BMI) OF 33.0 TO 33.9 IN ADULT: Chronic | ICD-10-CM

## 2019-03-11 DIAGNOSIS — M85.89 OSTEOPENIA OF MULTIPLE SITES: Chronic | ICD-10-CM

## 2019-03-11 DIAGNOSIS — G43.009 MIGRAINE WITHOUT AURA AND WITHOUT STATUS MIGRAINOSUS, NOT INTRACTABLE: Chronic | ICD-10-CM

## 2019-03-11 DIAGNOSIS — R00.2 PALPITATIONS: Primary | Chronic | ICD-10-CM

## 2019-03-11 PROBLEM — F34.1 DYSTHYMIA: Chronic | Status: ACTIVE | Noted: 2019-03-11

## 2019-03-11 PROCEDURE — 99214 OFFICE O/P EST MOD 30 MIN: CPT | Performed by: INTERNAL MEDICINE

## 2019-03-11 RX ORDER — CHOLECALCIFEROL (VITAMIN D3) 125 MCG
500 CAPSULE ORAL DAILY
COMMUNITY
Start: 2019-03-11

## 2019-03-11 RX ORDER — ZOLPIDEM TARTRATE 10 MG/1
10 TABLET ORAL NIGHTLY PRN
Qty: 30 TABLET | Refills: 2 | Status: SHIPPED | OUTPATIENT
Start: 2019-03-11 | End: 2019-05-31 | Stop reason: SDUPTHER

## 2019-03-11 RX ORDER — DIGOXIN 125 MCG
125 TABLET ORAL
Qty: 90 TABLET | Refills: 3 | Status: SHIPPED | OUTPATIENT
Start: 2019-03-11 | End: 2020-06-04 | Stop reason: SDUPTHER

## 2019-03-11 NOTE — PATIENT INSTRUCTIONS
Exercising to Lose Weight  Exercising can help you to lose weight. In order to lose weight through exercise, you need to do vigorous-intensity exercise. You can tell that you are exercising with vigorous intensity if you are breathing very hard and fast and cannot hold a conversation while exercising.  Moderate-intensity exercise helps to maintain your current weight. You can tell that you are exercising at a moderate level if you have a higher heart rate and faster breathing, but you are still able to hold a conversation.  How often should I exercise?  Choose an activity that you enjoy and set realistic goals. Your health care provider can help you to make an activity plan that works for you. Exercise regularly as directed by your health care provider. This may include:  · Doing resistance training twice each week, such as:  ? Push-ups.  ? Sit-ups.  ? Lifting weights.  ? Using resistance bands.  · Doing a given intensity of exercise for a given amount of time. Choose from these options:  ? 150 minutes of moderate-intensity exercise every week.  ? 75 minutes of vigorous-intensity exercise every week.  ? A mix of moderate-intensity and vigorous-intensity exercise every week.    Children, pregnant women, people who are out of shape, people who are overweight, and older adults may need to consult a health care provider for individual recommendations. If you have any sort of medical condition, be sure to consult your health care provider before starting a new exercise program.  What are some activities that can help me to lose weight?  · Walking at a rate of at least 4.5 miles an hour.  · Jogging or running at a rate of 5 miles per hour.  · Biking at a rate of at least 10 miles per hour.  · Lap swimming.  · Roller-skating or in-line skating.  · Cross-country skiing.  · Vigorous competitive sports, such as football, basketball, and soccer.  · Jumping rope.  · Aerobic dancing.  How can I be more active in my day-to-day  activities?  · Use the stairs instead of the elevator.  · Take a walk during your lunch break.  · If you drive, park your car farther away from work or school.  · If you take public transportation, get off one stop early and walk the rest of the way.  · Make all of your phone calls while standing up and walking around.  · Get up, stretch, and walk around every 30 minutes throughout the day.  What guidelines should I follow while exercising?  · Do not exercise so much that you hurt yourself, feel dizzy, or get very short of breath.  · Consult your health care provider prior to starting a new exercise program.  · Wear comfortable clothes and shoes with good support.  · Drink plenty of water while you exercise to prevent dehydration or heat stroke. Body water is lost during exercise and must be replaced.  · Work out until you breathe faster and your heart beats faster.  This information is not intended to replace advice given to you by your health care provider. Make sure you discuss any questions you have with your health care provider.  Document Released: 01/20/2012 Document Revised: 05/25/2017 Document Reviewed: 05/21/2015  [employer] Patient Education © 2019 [employer]      Calorie Counting for Weight Loss  Calories are units of energy. Your body needs a certain amount of calories from food to keep you going throughout the day. When you eat more calories than your body needs, your body stores the extra calories as fat. When you eat fewer calories than your body needs, your body burns fat to get the energy it needs.  Calorie counting means keeping track of how many calories you eat and drink each day. Calorie counting can be helpful if you need to lose weight. If you make sure to eat fewer calories than your body needs, you should lose weight. Ask your health care provider what a healthy weight is for you.  For calorie counting to work, you will need to eat the right number of calories in a day in order  to lose a healthy amount of weight per week. A dietitian can help you determine how many calories you need in a day and will give you suggestions on how to reach your calorie goal.  · A healthy amount of weight to lose per week is usually 1-2 lb (0.5-0.9 kg). This usually means that your daily calorie intake should be reduced by 500-750 calories.  · Eating 1,200 - 1,500 calories per day can help most women lose weight.  · Eating 1,500 - 1,800 calories per day can help most men lose weight.    What is my plan?  My goal is to have __________ calories per day.  If I have this many calories per day, I should lose around __________ pounds per week.  What do I need to know about calorie counting?  In order to meet your daily calorie goal, you will need to:  · Find out how many calories are in each food you would like to eat. Try to do this before you eat.  · Decide how much of the food you plan to eat.  · Write down what you ate and how many calories it had. Doing this is called keeping a food log.    To successfully lose weight, it is important to balance calorie counting with a healthy lifestyle that includes regular activity. Aim for 150 minutes of moderate exercise (such as walking) or 75 minutes of vigorous exercise (such as running) each week.  Where do I find calorie information?    The number of calories in a food can be found on a Nutrition Facts label. If a food does not have a Nutrition Facts label, try to look up the calories online or ask your dietitian for help.  Remember that calories are listed per serving. If you choose to have more than one serving of a food, you will have to multiply the calories per serving by the amount of servings you plan to eat. For example, the label on a package of bread might say that a serving size is 1 slice and that there are 90 calories in a serving. If you eat 1 slice, you will have eaten 90 calories. If you eat 2 slices, you will have eaten 180 calories.  How do I keep a  "food log?  Immediately after each meal, record the following information in your food log:  · What you ate. Don't forget to include toppings, sauces, and other extras on the food.  · How much you ate. This can be measured in cups, ounces, or number of items.  · How many calories each food and drink had.  · The total number of calories in the meal.    Keep your food log near you, such as in a small notebook in your pocket, or use a mobile jamila or website. Some programs will calculate calories for you and show you how many calories you have left for the day to meet your goal.  What are some calorie counting tips?  · Use your calories on foods and drinks that will fill you up and not leave you hungry:  ? Some examples of foods that fill you up are nuts and nut butters, vegetables, lean proteins, and high-fiber foods like whole grains. High-fiber foods are foods with more than 5 g fiber per serving.  ? Drinks such as sodas, specialty coffee drinks, alcohol, and juices have a lot of calories, yet do not fill you up.  · Eat nutritious foods and avoid empty calories. Empty calories are calories you get from foods or beverages that do not have many vitamins or protein, such as candy, sweets, and soda. It is better to have a nutritious high-calorie food (such as an avocado) than a food with few nutrients (such as a bag of chips).  · Know how many calories are in the foods you eat most often. This will help you calculate calorie counts faster.  · Pay attention to calories in drinks. Low-calorie drinks include water and unsweetened drinks.  · Pay attention to nutrition labels for \"low fat\" or \"fat free\" foods. These foods sometimes have the same amount of calories or more calories than the full fat versions. They also often have added sugar, starch, or salt, to make up for flavor that was removed with the fat.  · Find a way of tracking calories that works for you. Get creative. Try different apps or programs if writing down " calories does not work for you.  What are some portion control tips?  · Know how many calories are in a serving. This will help you know how many servings of a certain food you can have.  · Use a measuring cup to measure serving sizes. You could also try weighing out portions on a kitchen scale. With time, you will be able to estimate serving sizes for some foods.  · Take some time to put servings of different foods on your favorite plates, bowls, and cups so you know what a serving looks like.  · Try not to eat straight from a bag or box. Doing this can lead to overeating. Put the amount you would like to eat in a cup or on a plate to make sure you are eating the right portion.  · Use smaller plates, glasses, and bowls to prevent overeating.  · Try not to multitask (for example, watch TV or use your computer) while eating. If it is time to eat, sit down at a table and enjoy your food. This will help you to know when you are full. It will also help you to be aware of what you are eating and how much you are eating.  What are tips for following this plan?  Reading food labels  · Check the calorie count compared to the serving size. The serving size may be smaller than what you are used to eating.  · Check the source of the calories. Make sure the food you are eating is high in vitamins and protein and low in saturated and trans fats.  Shopping  · Read nutrition labels while you shop. This will help you make healthy decisions before you decide to purchase your food.  · Make a grocery list and stick to it.  Cooking  · Try to cook your favorite foods in a healthier way. For example, try baking instead of frying.  · Use low-fat dairy products.  Meal planning  · Use more fruits and vegetables. Half of your plate should be fruits and vegetables.  · Include lean proteins like poultry and fish.  How do I count calories when eating out?  · Ask for smaller portion sizes.  · Consider sharing an entree and sides instead of  getting your own entree.  · If you get your own entree, eat only half. Ask for a box at the beginning of your meal and put the rest of your entree in it so you are not tempted to eat it.  · If calories are listed on the menu, choose the lower calorie options.  · Choose dishes that include vegetables, fruits, whole grains, low-fat dairy products, and lean protein.  · Choose items that are boiled, broiled, grilled, or steamed. Stay away from items that are buttered, battered, fried, or served with cream sauce. Items labeled “crispy” are usually fried, unless stated otherwise.  · Choose water, low-fat milk, unsweetened iced tea, or other drinks without added sugar. If you want an alcoholic beverage, choose a lower calorie option such as a glass of wine or light beer.  · Ask for dressings, sauces, and syrups on the side. These are usually high in calories, so you should limit the amount you eat.  · If you want a salad, choose a garden salad and ask for grilled meats. Avoid extra toppings like lion, cheese, or fried items. Ask for the dressing on the side, or ask for olive oil and vinegar or lemon to use as dressing.  · Estimate how many servings of a food you are given. For example, a serving of cooked rice is ½ cup or about the size of half a baseball. Knowing serving sizes will help you be aware of how much food you are eating at restaurants. The list below tells you how big or small some common portion sizes are based on everyday objects:  ? 1 oz--4 stacked dice.  ? 3 oz--1 deck of cards.  ? 1 tsp--1 die.  ? 1 Tbsp--½ a ping-pong ball.  ? 2 Tbsp--1 ping-pong ball.  ? ½ cup--½ baseball.  ? 1 cup--1 baseball.  Summary  · Calorie counting means keeping track of how many calories you eat and drink each day. If you eat fewer calories than your body needs, you should lose weight.  · A healthy amount of weight to lose per week is usually 1-2 lb (0.5-0.9 kg). This usually means reducing your daily calorie intake by 500-750  calories.  · The number of calories in a food can be found on a Nutrition Facts label. If a food does not have a Nutrition Facts label, try to look up the calories online or ask your dietitian for help.  · Use your calories on foods and drinks that will fill you up, and not on foods and drinks that will leave you hungry.  · Use smaller plates, glasses, and bowls to prevent overeating.  This information is not intended to replace advice given to you by your health care provider. Make sure you discuss any questions you have with your health care provider.  Document Released: 12/18/2006 Document Revised: 11/17/2017 Document Reviewed: 11/17/2017  [employer] Patient Education © 2019 [employer]

## 2019-03-11 NOTE — PROGRESS NOTES
Subjective     History of Present Illness     Alexa Luna is a 58 y.o. female who presents for annual follow up on hyperlipidemia, GERD, impaired fasting glucose, eustachian tube dysfunction, and ELVA among other issues.  She had left total knee replacement surgery at [facility] in [facility] on February 5th, 2018.  She has done well and is within 5 degrees of full extension with very minimal discomfort.  She has right total knee replacement surgery scheduled in one week on 03/18/2019.  She has a tripped planned the first week of April and was asking about a prescription for a wheelchair since she will still be recovering from her second knee replacement surgery in six weeks.  She continues to have occasional migraines, which she feels is under tolerable control currently.  We discussed the new injectable available, Aimovig, if migraines become more frequent. She is compliant with CPAP for sleep apnea.  She continues to report anosmia.      She has noticed increased symptoms of dysthymia with staying indoors more to recover from her recent knee replacement surgery, but feels she is adequately coping.  However, she is not sleeping well without the amitriptyline.  She has difficulty getting to sleep at night, but she feels some of this is due to the knee pain and inability to sleep in bed at night.   She continues on Ambien 5 mg on some nights taking the whole 10 mg tablet on other nights and sleeps well when she takes the whole tablet of Ambien.  She denies side effects including excessive daytime sedation or sleepwalking.  We discussed options to help improve sleep and have agreed to wait until after surgery next week to see if sleep improves once she can get back in her bed and have pain control.             Blood pressure below goal at 100/60.  She brought in her home blood pressure cuff for calibration which read 104/77.  Heart rate 88.    She was given metoprolol in the past to help deal  with palpitations and tendencies towards mildly rapid heart rate.  When she was here a month ago, her blood pressure was at goal, atlhough, her heart rate was a little fast.  We recommended she continue to hold the metoprolol, monitoring blood pressure and heart rate. She brings in her monitor today revealing majority of blood pressures at goal without the metoprolol, however, heart rate has been above goal and she has been taking the metoprolol episodically.  We will add digoxin to help lower heart rate without lowering blood pressure.     She has history of carotid atherosclerosis.  Carotid ultrasound 02/2017 revealed mild atherosclerotic plaque of the carotids, which we follow episodically.            DEXA dated 05/2017 revealed osteopenia of hip and lumbar spine.  She continues on weekly Fosamax, but has not been compliant with her calcium supplements.  We will plan to repeat DEXA this spring. An order is sent.        Vitamin B-12 is below goal at 268, for which I recommended OTC vitamin B-12.      Weight is up 5 pounds in the past year.       The patient's relevant past medical, surgical, and social history was reviewed in Epic.   Lab results are reviewed with the patient today.  CBC unremarkable. Fasting glucose 102. Normal renal and liver function.  Total cholesterol 168.  HDL 38.  LDL 99.  Triglycerides 154 increased from 117.  LDL/HDL ratio 2.61. Vitamin D has drifted down at 36.6.       Review of Systems   Constitutional: Negative for chills, fatigue and fever.   HENT: Negative for congestion, ear pain, postnasal drip, sinus pressure and sore throat.    Respiratory: Negative for cough, shortness of breath and wheezing.    Cardiovascular: Negative for chest pain, palpitations and leg swelling.   Gastrointestinal: Negative for abdominal pain, blood in stool, constipation, diarrhea, nausea and vomiting.   Endocrine: Negative for cold intolerance, heat intolerance, polydipsia and polyuria.   Genitourinary:  "Negative for dysuria, frequency, hematuria and urgency.   Musculoskeletal:        Knee pain.    Skin: Negative for rash.   Neurological: Negative for syncope and weakness.        Objective     Vitals:    03/11/19 1307   BP: 100/60   Pulse: 88   Temp: 97.9 °F (36.6 °C)   TempSrc: Oral   Weight: 85 kg (187 lb 6.4 oz)   Height: 162.6 cm (64\")     Physical Exam   Constitutional: She is oriented to person, place, and time. She appears well-developed and well-nourished. No distress.   Obese female.   HENT:   Head: Normocephalic and atraumatic.   Nose: Right sinus exhibits no maxillary sinus tenderness and no frontal sinus tenderness. Left sinus exhibits no maxillary sinus tenderness and no frontal sinus tenderness.   Mouth/Throat: Uvula is midline, oropharynx is clear and moist and mucous membranes are normal. No oral lesions. No tonsillar exudate.   Clear postnasal drip.    Eyes: Conjunctivae and EOM are normal. Pupils are equal, round, and reactive to light.   Neck: Trachea normal. Neck supple. No JVD present. Carotid bruit is not present. No tracheal deviation present. No thyroid mass and no thyromegaly present.   Cardiovascular: Normal rate, regular rhythm, normal heart sounds and intact distal pulses.  No extrasystoles are present. PMI is not displaced.   No murmur heard.  Pulmonary/Chest: Effort normal and breath sounds normal. No accessory muscle usage. No respiratory distress. She has no decreased breath sounds. She has no wheezes. She has no rhonchi. She has no rales.   Abdominal: Soft. Bowel sounds are normal. She exhibits no distension. There is no hepatosplenomegaly. There is no tenderness.   Obese abdomen.   Musculoskeletal:   Incision from recent total left knee replacement is healing well.  Exam reveals 1patient about 5 degrees away from full extension.     Vascular Status -  Her right foot exhibits normal foot vasculature  and no edema. Her left foot exhibits normal foot vasculature  and no " edema.  Lymphadenopathy:     She has no cervical adenopathy.   Neurological: She is alert and oriented to person, place, and time. No cranial nerve deficit. Coordination normal.   Skin: Skin is warm, dry and intact. No rash noted. No cyanosis. Nails show no clubbing.   Psychiatric: She has a normal mood and affect. Her speech is normal and behavior is normal. Judgment and thought content normal.   Vitals reviewed.        Assessment/Plan      Recommended she resume metoprolol 1/2 tablet q.a.m., taking  the other 1/2 tablet later in the day if needed.  A prescription is sent for digoxin to take one tablet q.d.  She may want to wait and start this after she comes home from her planned right total knee replacement surgery scheduled in one week on 03/18/2019.    Continue to monitor blood pressure and heart rate and notify me if not at goal.      We may need start lipid therapy in the future if she has any progression of lipids.  An order is sent for patient to schedule repeat carotid Doppler ultrasound.       Recommended she start OTC vitamin B-12 500 mcg one tablet daily.     Continue with planned right total knee replacement surgery scheduled in one week on 03/18/2019.  Patient is given a prescription for a wheelchair to help with ambulation due to her second knee replacement surgery in six weeks in this patient with osteoarthritis of the bilateral knees.     If migraines become more frequent, we can try the new injectable, Aimovig.   She has completely stopped her Elavil.      Refill the Ambien. Patient understands the risks associated with this controlled medication, including tolerance and addiction.  she also agrees to only obtain this medication from me, and not from a another provider, unless that provider is covering for me in my absence.  she also agrees to be compliant in dosing, and not self adjust the dose of medication.  A signed controlled substance agreement is on file, and she has received a controlled  substance education sheet at this a previous visit.  she has also signed a consent for treatment with a controlled substance as per [facility] policy. WENDY was obtained.    An order is sent to schedule repeat DEXA.  Restart Caltrate.  Continue with Fosamax 70 mg weekly.      She declines influenza vaccine, although, I encouraged her to consider an influenza vaccine next year.       Intensify diet, exercise, and weight loss efforts.  Recommended increasing physical activity when she has recovered from her knee replacement surgery.    Scribed for Dr. Abbasi by Kathryn Sweeney [facility].     Diagnoses and all orders for this visit:    Palpitations    Mixed hyperlipidemia  -     Comprehensive Metabolic Panel; Future  -     Lipid Panel; Future  -     TSH; Future    Atherosclerosis of both carotid arteries  -     US Carotid Bilateral    Migraine without aura and without status migrainosus, not intractable    Gastroesophageal reflux disease, esophagitis presence not specified    Obstructive sleep apnea syndrome    Class 1 obesity due to excess calories with serious comorbidity and body mass index (BMI) of 33.0 to 33.9 in adult  -     TSH; Future    Vitamin D deficiency  -     Vitamin D 25 Hydroxy; Future    Sleep disorder    Generalized anxiety disorder    Osteopenia of multiple sites  -     DEXA Bone Density Axial    Primary osteoarthritis of both knees - left TKR, 2/2019    Vitamin B12 deficiency  -     CBC Auto Differential; Future  -     Vitamin B12; Future    Other orders  -     metoprolol tartrate (LOPRESSOR) 25 MG tablet; 1/2- 1 tablet daily  -     digoxin (LANOXIN) 125 MCG tablet; Take 1 tablet by mouth Daily.  -     zolpidem (AMBIEN) 10 MG tablet; Take 1 tablet by mouth At Night As Needed for Sleep. 1/2 to 1 by mouth every night as needed for sleep.  -     vitamin B-12 (CYANOCOBALAMIN) 500 MCG tablet; Take 1 tablet by mouth Daily.        Lab on 03/05/2019   Component Date Value Ref Range Status   • WBC  03/05/2019 6.65  3.20 - 9.80 10*3/mm3 Final   • RBC 03/05/2019 4.67  3.77 - 5.16 10*6/mm3 Final   • Hemoglobin 03/05/2019 13.3  12.0 - 15.5 g/dL Final   • Hematocrit 03/05/2019 41.5  35.0 - 45.0 % Final   • MCV 03/05/2019 88.9  80.0 - 98.0 fL Final   • MCH 03/05/2019 28.5  26.5 - 34.0 pg Final   • MCHC 03/05/2019 32.0  31.4 - 36.0 g/dL Final   • RDW 03/05/2019 13.6  11.5 - 14.5 % Final   • RDW-SD 03/05/2019 43.1  36.4 - 46.3 fl Final   • MPV 03/05/2019 9.3  8.0 - 12.0 fL Final   • Platelets 03/05/2019 355  150 - 450 10*3/mm3 Final   • Neutrophil % 03/05/2019 40.4  37.0 - 80.0 % Final   • Lymphocyte % 03/05/2019 45.3  10.0 - 50.0 % Final   • Monocyte % 03/05/2019 7.1  0.0 - 12.0 % Final   • Eosinophil % 03/05/2019 6.6  0.0 - 7.0 % Final   • Basophil % 03/05/2019 0.6  0.0 - 2.0 % Final   • Neutrophils, Absolute 03/05/2019 2.69  2.00 - 8.60 10*3/mm3 Final   • Lymphocytes, Absolute 03/05/2019 3.01  0.60 - 4.20 10*3/mm3 Final   • Monocytes, Absolute 03/05/2019 0.47  0.00 - 0.90 10*3/mm3 Final   • Eosinophils, Absolute 03/05/2019 0.44  0.00 - 0.70 10*3/mm3 Final   • Basophils, Absolute 03/05/2019 0.04  0.00 - 0.20 10*3/mm3 Final   • Glucose 03/05/2019 102* 74 - 99 mg/dL Final   • BUN 03/05/2019 6* 7 - 17 mg/dL Final   • Creatinine 03/05/2019 1.05* 0.52 - 1.04 mg/dL Final   • Sodium 03/05/2019 141  137 - 145 mmol/L Final   • Potassium 03/05/2019 3.9  3.4 - 5.0 mmol/L Final   • Chloride 03/05/2019 104  98 - 107 mmol/L Final   • CO2 03/05/2019 27.0  22.0 - 30.0 mmol/L Final   • Calcium 03/05/2019 9.5  8.4 - 10.2 mg/dL Final   • Total Protein 03/05/2019 7.4  6.3 - 8.2 g/dL Final   • Albumin 03/05/2019 4.20  3.50 - 5.00 g/dL Final   • ALT (SGPT) 03/05/2019 16  <=35 U/L Final   • AST (SGOT) 03/05/2019 25  14 - 36 U/L Final   • Alkaline Phosphatase 03/05/2019 50  38 - 126 U/L Final   • Total Bilirubin 03/05/2019 0.8  0.2 - 1.3 mg/dL Final   • eGFR Non African Amer 03/05/2019 53  45 - 104 mL/min/1.73 Final   • Globulin 03/05/2019  3.2  2.3 - 3.5 gm/dL Final   • A/G Ratio 03/05/2019 1.3  1.1 - 1.8 g/dL Final   • BUN/Creatinine Ratio 03/05/2019 5.7* 7.0 - 25.0 Final   • Anion Gap 03/05/2019 10.0  5.0 - 15.0 mmol/L Final   • Total Cholesterol 03/05/2019 168  150 - 200 mg/dL Final   • Triglycerides 03/05/2019 154* <=150 mg/dL Final   • HDL Cholesterol 03/05/2019 38* 40 - 59 mg/dL Final   • LDL Cholesterol  03/05/2019 99  <=100 mg/dL Final   • VLDL Cholesterol 03/05/2019 30.8  mg/dL Final   • LDL/HDL Ratio 03/05/2019 2.61  0.00 - 3.22 Final   • TSH 03/05/2019 1.940  0.460 - 4.680 mIU/mL Final   • 25 Hydroxy, Vitamin D 03/05/2019 36.6  30.0 - 100.0 ng/ml Final   • Vitamin B-12 03/05/2019 248  239 - 931 pg/mL Final   ]

## 2019-05-31 ENCOUNTER — OFFICE VISIT (OUTPATIENT)
Dept: FAMILY MEDICINE CLINIC | Facility: CLINIC | Age: 59
End: 2019-05-31

## 2019-05-31 VITALS
DIASTOLIC BLOOD PRESSURE: 80 MMHG | HEIGHT: 64 IN | WEIGHT: 175.8 LBS | BODY MASS INDEX: 30.01 KG/M2 | HEART RATE: 68 BPM | TEMPERATURE: 97.8 F | SYSTOLIC BLOOD PRESSURE: 118 MMHG

## 2019-05-31 DIAGNOSIS — G47.9 SLEEP DISORDER: ICD-10-CM

## 2019-05-31 DIAGNOSIS — R53.81 PHYSICAL DECONDITIONING: ICD-10-CM

## 2019-05-31 DIAGNOSIS — R53.83 FATIGUE, UNSPECIFIED TYPE: ICD-10-CM

## 2019-05-31 DIAGNOSIS — J06.9 ACUTE URI: Primary | ICD-10-CM

## 2019-05-31 DIAGNOSIS — J30.1 SEASONAL ALLERGIC RHINITIS DUE TO POLLEN: ICD-10-CM
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
## 2019-05-31 PROCEDURE — 99213 OFFICE O/P EST LOW 20 MIN: CPT | Performed by: INTERNAL MEDICINE

## 2019-05-31 RX ORDER — PHENYLEPHRINE HCL 10 MG/1
TABLET, FILM COATED ORAL
Qty: 36 TABLET | Refills: 0 | Status: SHIPPED | OUTPATIENT
Start: 2019-05-31

## 2019-05-31 RX ORDER — ZOLPIDEM TARTRATE 10 MG/1
10 TABLET ORAL NIGHTLY PRN
Qty: 30 TABLET | Refills: 2 | Status: SHIPPED | OUTPATIENT
Start: 2019-05-31 | End: 2019-12-18 | Stop reason: SDUPTHER

## 2019-05-31 NOTE — PROGRESS NOTES
Subjective        History of Present Illness     Alexa Luna is a 58 y.o. female who presents reporting 3-day history of scratchy throat and nasal congestion consistsent with a flare of seasonal allergy symptoms .  Abner fever or chills.  She has been taking OTC Tylenol for generalized symptom relief and Singulair and Xyzal for allergy symptoms.     She also reports numerous tick bites over the weekend with only one tick bite on anterior chest from a small tick.  Denies rash.  Denies significant headache or fever or chills.  She has an indoor dog, which goes outdoors at times and brings ticks in the house.  We reviewed symptoms of tickborne illness to monitor for, espeically 7-10 days after bites occured.     She continues to require Ambien for sleep and typically takes 1/2 to 1 tablet nightly and denies any significant adverse side effects such as sleepwalking or excessive sedation the next day, although, she does experience vivid dreams. Patient understands the risks associated with this controlled medication, including tolerance and addiction and has a signed provider agreement.       Weight is down 21 pounds in the past three months.  She had total knee replacement surgery to bilateral knees last winter in [place] and has recovered well with full range of motion.   She is scheduled to return to work in three weeks and despite the weight loss, she doesn't feel her energy level is near baseline.  She describes physical deconditioning.  I recommended reconditioning exercises such as walking 15-20 minutes twice daily with interval walking.         Review of Systems   Constitutional: Positive for fatigue. Negative for chills and fever.   HENT: Positive for postnasal drip and sore throat. Negative for congestion, ear pain and sinus pressure.    Respiratory: Negative for cough, shortness of breath and wheezing.    Cardiovascular: Negative for chest pain, palpitations and leg swelling.   Gastrointestinal: Negative  "for abdominal pain, blood in stool, constipation, diarrhea, nausea and vomiting.   Endocrine: Negative for cold intolerance, heat intolerance, polydipsia and polyuria.   Genitourinary: Negative for dysuria, frequency, hematuria and urgency.   Skin: Negative for rash.   Neurological: Negative for syncope and weakness.        Objective     Vitals:    05/31/19 1106   BP: 118/80   Pulse: 68   Temp: 97.8 °F (36.6 °C)   TempSrc: Oral   Weight: 79.7 kg (175 lb 12.8 oz)   Height: 162.6 cm (64\")     Physical Exam   Constitutional: She is oriented to person, place, and time. She appears well-developed and well-nourished. No distress.   Obese female.    HENT:   Head: Normocephalic and atraumatic.   Nose: Nose normal.   Mouth/Throat: Oropharynx is clear and moist. No oropharyngeal exudate.   Clear postnasal drip and small amount of cerumen in right ear canal.    Eyes: EOM are normal. Pupils are equal, round, and reactive to light.   Neck: Neck supple. No JVD present. No thyromegaly present.   Cardiovascular: Normal rate, regular rhythm and normal heart sounds.   Pulmonary/Chest: Effort normal and breath sounds normal. No accessory muscle usage. No respiratory distress. She has no wheezes. She has no rales.   Abdominal: Soft. Bowel sounds are normal. She exhibits no distension. There is no tenderness.   Musculoskeletal: She exhibits no edema.   Surgical scars from recent knee replacement surgery healing well.     Lymphadenopathy:     She has no cervical adenopathy.   Neurological: She is alert and oriented to person, place, and time. No cranial nerve deficit.   Psychiatric: She has a normal mood and affect. Her speech is normal and behavior is normal. Judgment and thought content normal.         Future Appointments   Date Time Provider Department Center   3/17/2020  3:30 PM Scot Abbasi MD [facility] None     Assessment/Plan      Monitor for signs of tick borne illness as reviewed with patient today and notify me if these " should occur.     For the acute URI, a prescription is sent for Sudafed PE 10 mg to take 1 po 2-3 times daily prn congestion.  Continue with Singulair.  Resume daily antihistamine.        She is congratulated on weight loss and encouraged to intensify her diet, exercise and weight loss efforts.      Refill if given for Ambien. Patient understands the risks associated with this controlled medication, including tolerance and addiction.  she also agrees to only obtain this medication from me, and not from a another provider, unless that provider is covering for me in my absence.  she also agrees to be compliant in dosing, and not self adjust the dose of medication.  A signed controlled substance agreement is on file, and she has received a controlled substance education sheet at this a previous visit.  she has also signed a consent for treatment with a controlled substance as per [facility] policy. WENDY was obtained.      Return in March for routine follow up with fasting labs one week prior or sooner if needed.     Diagnoses and all orders for this visit:    Acute URI    Seasonal allergic rhinitis due to pollen    Tick bite, initial encounter    Fatigue, unspecified type    Sleep disorder    Physical deconditioning    Other orders  -     zolpidem (AMBIEN) 10 MG tablet; Take 1 tablet by mouth At Night As Needed for Sleep. 1/2 to 1 by mouth every night as needed for sleep.  -     phenylephrine (SUDAFED PE) 10 MG tablet; 1 po 2-3 times daily prn congestion        No visits with results within 3 Week(s) from this visit.   Latest known visit with results is:   Admission on 04/07/2019, Discharged on 04/07/2019   Component Date Value Ref Range Status   • Rapid Influenza A Ag 04/07/2019 Negative  Negative Final   • Rapid Influenza B Ag 04/07/2019 Negative  Negative Final   • Internal Control 04/07/2019 Passed  Passed Final   • Lot Number 04/07/2019 [id number]   Final   • Expiration Date 04/07/2019 4/21   Final   ]

## 2019-06-28 RX ORDER — SUMATRIPTAN 100 MG/1
TABLET, FILM COATED ORAL
Qty: 27 TABLET | Refills: 3 | Status: SHIPPED | OUTPATIENT
Start: 2019-06-28

## 2019-06-28 RX ORDER — ALENDRONATE SODIUM 70 MG/1
70 TABLET ORAL WEEKLY
Qty: 12 TABLET | Refills: 3 | Status: SHIPPED | OUTPATIENT
Start: 2019-06-28

## 2019-06-28 RX ORDER — MONTELUKAST SODIUM 10 MG/1
10 TABLET ORAL DAILY
Qty: 90 TABLET | Refills: 3 | Status: SHIPPED | OUTPATIENT
Start: 2019-06-28

## 2019-07-23 ENCOUNTER — LAB (OUTPATIENT)
Dept: LAB | Facility: OTHER | Age: 59
End: 2019-07-23

## 2019-07-23 ENCOUNTER — OFFICE VISIT (OUTPATIENT)
Dept: FAMILY MEDICINE CLINIC | Facility: CLINIC | Age: 59
End: 2019-07-23

## 2019-07-23 VITALS
BODY MASS INDEX: 29.88 KG/M2 | TEMPERATURE: 98 F | SYSTOLIC BLOOD PRESSURE: 110 MMHG | WEIGHT: 175 LBS | HEIGHT: 64 IN | HEART RATE: 80 BPM | DIASTOLIC BLOOD PRESSURE: 60 MMHG

## 2019-07-23 DIAGNOSIS — L65.9 HAIR LOSS: ICD-10-CM

## 2019-07-23 DIAGNOSIS — E66.09 CLASS 1 OBESITY DUE TO EXCESS CALORIES WITH SERIOUS COMORBIDITY AND BODY MASS INDEX (BMI) OF 30.0 TO 30.9 IN ADULT: Chronic | ICD-10-CM

## 2019-07-23 DIAGNOSIS — M25.462 EFFUSION, LEFT KNEE: ICD-10-CM

## 2019-07-23 DIAGNOSIS — S80.02XA CONTUSION OF LEFT KNEE, INITIAL ENCOUNTER: Primary | ICD-10-CM

## 2019-07-23 DIAGNOSIS — K21.9 GASTROESOPHAGEAL REFLUX DISEASE, ESOPHAGITIS PRESENCE NOT SPECIFIED: Chronic | ICD-10-CM

## 2019-07-23 DIAGNOSIS — M17.0 PRIMARY OSTEOARTHRITIS OF BOTH KNEES: Chronic | ICD-10-CM

## 2019-07-23 PROCEDURE — 99214 OFFICE O/P EST MOD 30 MIN: CPT | Performed by: INTERNAL MEDICINE

## 2019-07-23 PROCEDURE — 84443 ASSAY THYROID STIM HORMONE: CPT | Performed by: INTERNAL MEDICINE

## 2019-07-23 PROCEDURE — 36415 COLL VENOUS BLD VENIPUNCTURE: CPT | Performed by: INTERNAL MEDICINE

## 2019-07-23 RX ORDER — OMEPRAZOLE 20 MG/1
20 CAPSULE, DELAYED RELEASE ORAL DAILY
Qty: 90 CAPSULE | Refills: 3 | Status: SHIPPED | OUTPATIENT
Start: 2019-07-23

## 2019-07-23 RX ORDER — TOPIRAMATE 50 MG/1
150 TABLET, FILM COATED ORAL DAILY
Qty: 270 TABLET | Refills: 3 | Status: SHIPPED | OUTPATIENT
Start: 2019-07-23 | End: 2020-03-31

## 2019-07-23 RX ORDER — MELOXICAM 15 MG/1
15 TABLET ORAL DAILY PRN
Qty: 30 TABLET | Refills: 0 | Status: SHIPPED | OUTPATIENT
Start: 2019-07-23 | End: 2020-03-31 | Stop reason: SDUPTHER

## 2019-07-23 NOTE — PROGRESS NOTES
Subjective        History of Present Illness     Alexa Luna is a 58 y.o. female who comes in for follow up from emergency room visit at [facility] on 07/18/2019 after sustaining a fall in her home while trying to step out of the strap of a pair of high heels landing on her knees, for which she had left knee replacement 02/06/2019 by Dr. Coronel in [place], [place].  She has taken Tramadol and Tylenol for pain.  X-ray of the left knee revealed satisfactory position and alignment of the knee prosthesis without evidence of fracture with accumulation of fluid in the suprapatellar bursa possibly representing hemarthrosis. She does not have a follow up appointment scheduled with her orthopedist.  Pain is not keeping her awake at night and is not limiting her daily activity.   She mentions she flared back pain with the fall as well.      She reports pruritis of the scalp three days ago, which she only noticed for the one day.   She is also reporting persistent worsening hair thinning.  Labs in March revealed normal thyroid function.  She took Biotin in the past.  We will refer to dermatology and recheck a TSH today.        Review of Systems   Constitutional: Negative for chills, fatigue and fever.   HENT: Negative for congestion, ear pain, postnasal drip, sinus pressure and sore throat.    Respiratory: Negative for cough, shortness of breath and wheezing.    Cardiovascular: Negative for chest pain, palpitations and leg swelling.   Gastrointestinal: Negative for abdominal pain, blood in stool, constipation, diarrhea, nausea and vomiting.   Endocrine: Negative for cold intolerance, heat intolerance, polydipsia and polyuria.   Genitourinary: Negative for dysuria, frequency, hematuria and urgency.   Musculoskeletal: Positive for back pain.        Left knee pain.    Skin: Negative for rash.   Neurological: Negative for syncope and weakness.      Objective     Vitals:    07/23/19 1537   BP: 110/60   Pulse: 80   Temp: 98 °F (36.7  "°C)   TempSrc: Oral   Weight: 79.4 kg (175 lb)   Height: 162.6 cm (64\")     Physical Exam   Constitutional: She is oriented to person, place, and time. She appears well-developed and well-nourished. No distress.   HENT:   Head: Normocephalic and atraumatic.   Nose: Nose normal.   Mouth/Throat: Oropharynx is clear and moist. No oropharyngeal exudate.   Eyes: EOM are normal. Pupils are equal, round, and reactive to light.   Neck: Neck supple. No JVD present. No thyromegaly present.   Cardiovascular: Normal rate, regular rhythm and normal heart sounds.   Pulmonary/Chest: Effort normal and breath sounds normal. No accessory muscle usage. No respiratory distress. She has no wheezes. She has no rales.   Abdominal: Soft. Bowel sounds are normal. She exhibits no distension. There is no tenderness.   Musculoskeletal: She exhibits no edema.   Exam of the left knee reveals moderate contusion of left knee.       Lymphadenopathy:     She has no cervical adenopathy.   Neurological: She is alert and oriented to person, place, and time. No cranial nerve deficit.   Skin:   Generalized thinning of hair follicles of the scalp.  No focal areas of alopecia.   Psychiatric: She has a normal mood and affect. Her speech is normal and behavior is normal. Judgment and thought content normal.     Future Appointments   Date Time Provider Department Center   3/17/2020  3:30 PM Scot Abbasi MD [facility] None       Assessment/Plan      For the new problem of contusion of left knee with resulting effusion, a prescription is sent for Mobic 15 mg to take one q.d. with food.  Take the Prilosec daily and increase to b.i.d. for GERD/gastritis flares while on the course of NSAIDs. Using a cold pack has given temporary relief of pain and swelling, and she can continue that as needed. She can continue to use the Tramadol and Tylenol as needed. If no improvement, I recommended she schedule a follow up with her orthopedist, Dr. Coronel, in [place], who " performed the left knee replacement 02/2019.     The complaint of hair loss is a new problem.  We will recheck a TSH today, although it was normal a few months ago, and refer to Dr. Kelin Jade, dermatology in [place], per patient request to address the hair loss.  Recommended OTC hair/nail formula vitamin for hair loss.    I am pleased to see some weight loss in this obese patient.  Continue those efforts.       Return in March for routine follow up with fasting labs one week prior or sooner if needed.     Scribed for Dr. Abbasi by Kathryn Sweeney [facility].     Diagnoses and all orders for this visit:    Contusion of left knee, initial encounter    Effusion, left knee    Gastroesophageal reflux disease, esophagitis presence not specified    Hair loss  -     TSH; Future  -     Cancel: Ambulatory Referral to Dermatology  -     Ambulatory Referral to Dermatology    Primary osteoarthritis of both knees - left TKR, 2/2019    Class 1 obesity due to excess calories with serious comorbidity and body mass index (BMI) of 30.0 to 30.9 in adult    Other orders  -     omeprazole (priLOSEC) 20 MG capsule; Take 1 capsule by mouth Daily.  -     topiramate (TOPAMAX) 50 MG tablet; Take 3 tablets by mouth Daily.  -     meloxicam (MOBIC) 15 MG tablet; Take 1 tablet by mouth Daily As Needed (pain). Take with food        No visits with results within 3 Week(s) from this visit.   Latest known visit with results is:   Admission on 04/07/2019, Discharged on 04/07/2019   Component Date Value Ref Range Status   • Rapid Influenza A Ag 04/07/2019 Negative  Negative Final   • Rapid Influenza B Ag 04/07/2019 Negative  Negative Final   • Internal Control 04/07/2019 Passed  Passed Final   • Lot Number 04/07/2019 [id number]   Final   • Expiration Date 04/07/2019 4/21   Final   ]

## 2019-07-23 NOTE — PATIENT INSTRUCTIONS

## 2019-07-24 LAB — TSH SERPL DL<=0.05 MIU/L-ACNC: 1.43 MIU/ML (ref 0.27–4.2)

## 2019-09-09 ENCOUNTER — OFFICE VISIT (OUTPATIENT)
Dept: FAMILY MEDICINE CLINIC | Facility: CLINIC | Age: 59
End: 2019-09-09

## 2019-09-09 VITALS
BODY MASS INDEX: 29.19 KG/M2 | WEIGHT: 171 LBS | SYSTOLIC BLOOD PRESSURE: 100 MMHG | HEIGHT: 64 IN | DIASTOLIC BLOOD PRESSURE: 60 MMHG | HEART RATE: 64 BPM | TEMPERATURE: 97.9 F

## 2019-09-09 DIAGNOSIS — N23 RENAL COLIC ON LEFT SIDE: Primary | ICD-10-CM

## 2019-09-09 DIAGNOSIS — M54.32 SCIATICA OF LEFT SIDE: ICD-10-CM

## 2019-09-09 PROCEDURE — 99214 OFFICE O/P EST MOD 30 MIN: CPT | Performed by: INTERNAL MEDICINE

## 2019-09-09 RX ORDER — TAMSULOSIN HYDROCHLORIDE 0.4 MG/1
1 CAPSULE ORAL DAILY
Qty: 7 CAPSULE | Refills: 0 | Status: SHIPPED | OUTPATIENT
Start: 2019-09-09 | End: 2020-03-31

## 2019-09-09 RX ORDER — HYDROCODONE BITARTRATE AND ACETAMINOPHEN 7.5; 325 MG/1; MG/1
TABLET ORAL
Qty: 20 TABLET | Refills: 0 | Status: SHIPPED | OUTPATIENT
Start: 2019-09-09 | End: 2020-03-31

## 2019-09-09 RX ORDER — HYDROCODONE BITARTRATE AND ACETAMINOPHEN 5; 325 MG/1; MG/1
1 TABLET ORAL EVERY 6 HOURS PRN
Refills: 0 | COMMUNITY
Start: 2019-09-07 | End: 2019-09-09 | Stop reason: DRUGHIGH

## 2019-09-09 RX ORDER — ONDANSETRON 4 MG/1
4 TABLET, ORALLY DISINTEGRATING ORAL
COMMUNITY
Start: 2019-09-07

## 2019-09-11 ENCOUNTER — TELEPHONE (OUTPATIENT)
Dept: FAMILY MEDICINE CLINIC | Facility: CLINIC | Age: 59
End: 2019-09-11

## 2019-09-11 DIAGNOSIS — N23 RENAL COLIC ON LEFT SIDE: Primary | ICD-10-CM

## 2019-09-11 NOTE — TELEPHONE ENCOUNTER
09/11/2019 I relayed to patient and we will make apt with whoever can see her the quickest. TP      ----- Message from Scot Abbasi MD sent at 9/11/2019 12:49 PM CDT -----  Refer to urology to address renal colic that has not resolved.  She will need an appointment very soon.  EB  ----- Message -----  From: Isa Johansen RN  Sent: 9/11/2019  12:09 PM  To: Scot Abbasi MD    She called and states the problem with kidney stone hasnt improved. She still has some Lortabs lef tbut she didn't know what to do. Please advise. TP

## 2019-10-08 DIAGNOSIS — K80.20 CALCULUS OF GALLBLADDER WITHOUT CHOLECYSTITIS WITHOUT OBSTRUCTION: ICD-10-CM

## 2019-10-08 DIAGNOSIS — R10.84 GENERALIZED ABDOMINAL PAIN: Primary | ICD-10-CM

## 2019-12-18 DIAGNOSIS — G47.9 SLEEP DISORDER: Primary | ICD-10-CM

## 2019-12-18 RX ORDER — ZOLPIDEM TARTRATE 10 MG/1
10 TABLET ORAL NIGHTLY PRN
Qty: 30 TABLET | Refills: 0 | Status: SHIPPED | OUTPATIENT
Start: 2019-12-18 | End: 2020-03-16 | Stop reason: SDUPTHER

## 2020-03-16 DIAGNOSIS — G47.9 SLEEP DISORDER: ICD-10-CM

## 2020-03-16 RX ORDER — ZOLPIDEM TARTRATE 10 MG/1
10 TABLET ORAL NIGHTLY PRN
Qty: 14 TABLET | Refills: 0 | Status: SHIPPED | OUTPATIENT
Start: 2020-03-16 | End: 2020-03-31 | Stop reason: SDUPTHER

## 2020-03-23 DIAGNOSIS — E55.9 VITAMIN D DEFICIENCY: ICD-10-CM

## 2020-03-23 DIAGNOSIS — E53.8 VITAMIN B12 DEFICIENCY: ICD-10-CM

## 2020-03-23 DIAGNOSIS — E66.09 CLASS 1 OBESITY DUE TO EXCESS CALORIES WITH SERIOUS COMORBIDITY AND BODY MASS INDEX (BMI) OF 30.0 TO 30.9 IN ADULT: Primary | ICD-10-CM

## 2020-03-23 DIAGNOSIS — E78.2 MIXED HYPERLIPIDEMIA: ICD-10-CM

## 2020-03-24 ENCOUNTER — LAB (OUTPATIENT)
Dept: LAB | Facility: OTHER | Age: 60
End: 2020-03-24

## 2020-03-24 DIAGNOSIS — E53.8 VITAMIN B12 DEFICIENCY: ICD-10-CM

## 2020-03-24 DIAGNOSIS — E55.9 VITAMIN D DEFICIENCY: ICD-10-CM

## 2020-03-24 DIAGNOSIS — E66.09 CLASS 1 OBESITY DUE TO EXCESS CALORIES WITH SERIOUS COMORBIDITY AND BODY MASS INDEX (BMI) OF 30.0 TO 30.9 IN ADULT: ICD-10-CM

## 2020-03-24 DIAGNOSIS — E78.2 MIXED HYPERLIPIDEMIA: ICD-10-CM

## 2020-03-24 LAB
25(OH)D3 SERPL-MCNC: 35 NG/ML (ref 30–100)
ALBUMIN SERPL-MCNC: 4 G/DL (ref 3.5–5)
ALBUMIN/GLOB SERPL: 1.2 G/DL (ref 1.1–1.8)
ALP SERPL-CCNC: 55 U/L (ref 38–126)
ALT SERPL W P-5'-P-CCNC: 13 U/L
ANION GAP SERPL CALCULATED.3IONS-SCNC: 11 MMOL/L (ref 5–15)
AST SERPL-CCNC: 21 U/L (ref 14–36)
BASOPHILS # BLD AUTO: 0.05 10*3/MM3 (ref 0–0.2)
BASOPHILS NFR BLD AUTO: 0.7 % (ref 0–1.5)
BILIRUB SERPL-MCNC: 0.4 MG/DL (ref 0.2–1.3)
BUN BLD-MCNC: 10 MG/DL (ref 7–23)
BUN/CREAT SERPL: 9.5 (ref 7–25)
CALCIUM SPEC-SCNC: 9.3 MG/DL (ref 8.4–10.2)
CHLORIDE SERPL-SCNC: 106 MMOL/L (ref 101–112)
CHOLEST SERPL-MCNC: 164 MG/DL (ref 150–200)
CO2 SERPL-SCNC: 26 MMOL/L (ref 22–30)
CREAT BLD-MCNC: 1.05 MG/DL (ref 0.52–1.04)
DEPRECATED RDW RBC AUTO: 43.2 FL (ref 37–54)
EOSINOPHIL # BLD AUTO: 0.53 10*3/MM3 (ref 0–0.4)
EOSINOPHIL NFR BLD AUTO: 7.3 % (ref 0.3–6.2)
ERYTHROCYTE [DISTWIDTH] IN BLOOD BY AUTOMATED COUNT: 14.4 % (ref 12.3–15.4)
GFR SERPL CREATININE-BSD FRML MDRD: 53 ML/MIN/1.73 (ref 45–104)
GLOBULIN UR ELPH-MCNC: 3.3 GM/DL (ref 2.3–3.5)
GLUCOSE BLD-MCNC: 104 MG/DL (ref 70–99)
HCT VFR BLD AUTO: 41.6 % (ref 34–46.6)
HDLC SERPL-MCNC: 38 MG/DL (ref 40–59)
HGB BLD-MCNC: 13.9 G/DL (ref 12–15.9)
LDLC SERPL CALC-MCNC: 97 MG/DL
LDLC/HDLC SERPL: 2.56 {RATIO} (ref 0–3.22)
LYMPHOCYTES # BLD AUTO: 2.98 10*3/MM3 (ref 0.7–3.1)
LYMPHOCYTES NFR BLD AUTO: 40.8 % (ref 19.6–45.3)
MCH RBC QN AUTO: 28.1 PG (ref 26.6–33)
MCHC RBC AUTO-ENTMCNC: 33.4 G/DL (ref 31.5–35.7)
MCV RBC AUTO: 84.2 FL (ref 79–97)
MONOCYTES # BLD AUTO: 0.45 10*3/MM3 (ref 0.1–0.9)
MONOCYTES NFR BLD AUTO: 6.2 % (ref 5–12)
NEUTROPHILS # BLD AUTO: 3.3 10*3/MM3 (ref 1.7–7)
NEUTROPHILS NFR BLD AUTO: 45 % (ref 42.7–76)
PLATELET # BLD AUTO: 292 10*3/MM3 (ref 140–450)
PMV BLD AUTO: 9.8 FL (ref 6–12)
POTASSIUM BLD-SCNC: 3.7 MMOL/L (ref 3.4–5)
PROT SERPL-MCNC: 7.3 G/DL (ref 6.3–8.6)
RBC # BLD AUTO: 4.94 10*6/MM3 (ref 3.77–5.28)
SODIUM BLD-SCNC: 143 MMOL/L (ref 137–145)
TRIGL SERPL-MCNC: 143 MG/DL
TSH SERPL DL<=0.05 MIU/L-ACNC: 3.9 UIU/ML (ref 0.27–4.2)
VIT B12 BLD-MCNC: >2000 PG/ML (ref 211–946)
VLDLC SERPL-MCNC: 28.6 MG/DL
WBC NRBC COR # BLD: 7.31 10*3/MM3 (ref 3.4–10.8)

## 2020-03-24 PROCEDURE — 82607 VITAMIN B-12: CPT | Performed by: INTERNAL MEDICINE

## 2020-03-24 PROCEDURE — 36415 COLL VENOUS BLD VENIPUNCTURE: CPT | Performed by: INTERNAL MEDICINE

## 2020-03-24 PROCEDURE — 80053 COMPREHEN METABOLIC PANEL: CPT | Performed by: INTERNAL MEDICINE

## 2020-03-24 PROCEDURE — 82306 VITAMIN D 25 HYDROXY: CPT | Performed by: INTERNAL MEDICINE

## 2020-03-24 PROCEDURE — 85025 COMPLETE CBC W/AUTO DIFF WBC: CPT | Performed by: INTERNAL MEDICINE

## 2020-03-24 PROCEDURE — 80061 LIPID PANEL: CPT | Performed by: INTERNAL MEDICINE

## 2020-03-24 PROCEDURE — 84443 ASSAY THYROID STIM HORMONE: CPT | Performed by: INTERNAL MEDICINE

## 2020-03-31 ENCOUNTER — OFFICE VISIT (OUTPATIENT)
Dept: FAMILY MEDICINE CLINIC | Facility: CLINIC | Age: 60
End: 2020-03-31

## 2020-03-31 VITALS
DIASTOLIC BLOOD PRESSURE: 60 MMHG | BODY MASS INDEX: 30.87 KG/M2 | SYSTOLIC BLOOD PRESSURE: 104 MMHG | HEIGHT: 64 IN | TEMPERATURE: 97.9 F | HEART RATE: 60 BPM | WEIGHT: 180.8 LBS

## 2020-03-31 DIAGNOSIS — G43.009 MIGRAINE WITHOUT AURA AND WITHOUT STATUS MIGRAINOSUS, NOT INTRACTABLE: Chronic | ICD-10-CM

## 2020-03-31 DIAGNOSIS — K21.9 GASTROESOPHAGEAL REFLUX DISEASE, ESOPHAGITIS PRESENCE NOT SPECIFIED: Chronic | ICD-10-CM

## 2020-03-31 DIAGNOSIS — R00.2 PALPITATIONS: Chronic | ICD-10-CM

## 2020-03-31 DIAGNOSIS — R53.83 FATIGUE, UNSPECIFIED TYPE: ICD-10-CM

## 2020-03-31 DIAGNOSIS — G47.9 SLEEP DISORDER: ICD-10-CM

## 2020-03-31 DIAGNOSIS — M51.9 DISC DISORDER OF LUMBAR REGION: Chronic | ICD-10-CM

## 2020-03-31 DIAGNOSIS — E78.2 MIXED HYPERLIPIDEMIA: Primary | Chronic | ICD-10-CM

## 2020-03-31 DIAGNOSIS — E66.09 CLASS 1 OBESITY DUE TO EXCESS CALORIES WITH SERIOUS COMORBIDITY AND BODY MASS INDEX (BMI) OF 31.0 TO 31.9 IN ADULT: Chronic | ICD-10-CM

## 2020-03-31 DIAGNOSIS — K64.4 EXTERNAL HEMORRHOIDS: ICD-10-CM

## 2020-03-31 DIAGNOSIS — M85.89 OSTEOPENIA OF MULTIPLE SITES: Chronic | ICD-10-CM

## 2020-03-31 DIAGNOSIS — G47.33 OBSTRUCTIVE SLEEP APNEA SYNDROME: Chronic | ICD-10-CM

## 2020-03-31 DIAGNOSIS — F34.1 DYSTHYMIA: Chronic | ICD-10-CM

## 2020-03-31 PROCEDURE — 99214 OFFICE O/P EST MOD 30 MIN: CPT | Performed by: INTERNAL MEDICINE

## 2020-03-31 RX ORDER — MELOXICAM 15 MG/1
15 TABLET ORAL DAILY PRN
Qty: 90 TABLET | Refills: 3 | Status: SHIPPED | OUTPATIENT
Start: 2020-03-31

## 2020-03-31 RX ORDER — ZOLPIDEM TARTRATE 10 MG/1
10 TABLET ORAL NIGHTLY PRN
Qty: 90 TABLET | Refills: 0 | Status: SHIPPED | OUTPATIENT
Start: 2020-03-31

## 2020-03-31 RX ORDER — TOPIRAMATE 50 MG/1
50 TABLET, FILM COATED ORAL DAILY
Qty: 90 TABLET | Refills: 3 | Status: SHIPPED | COMMUNITY
Start: 2020-03-31

## 2020-03-31 NOTE — PROGRESS NOTES
Subjective        History of Present Illness     Alexa Luna is a 59 y.o. female who presents for annual follow up on hyperlipidemia, GERD, impaired fasting glucose, sleep apnea, chronic venous insuffiency,  and ELVA among other issues.  Dr. Simone Heath, neurosurgeon, is treating patient for meralgia paresthetica, for which he tried her on Neurontin.  She tolerated the Neurontin without difficulty, although, she did not notice a significant improvement and discontinued it.  She continues on daily metoprolol for episodic palpitations.  She is compliant with wearing nightime CPAP, but has not noticed a significant improvement in fatigue.  GERD symptoms adequately managed with Prilosec.      She continues to have episodic lumbar pain with extended periods of weightbearing.  She works as a teller at the [employer] and does notice increased pain when required to stand for prolonged periods of weightbearing.  She is able to sit quite often, which does help.   Mobic did seem to help, although, she is currently out of the Mobic.  She also takes Tramadol.      She is reporting hemorrhoidal itching and burning with fluctuating constipation and diarrhea.  She has tried  OTC Preparation H without much improvement.            She describes foot pain consistent with metatarsalgia.  The pain is more noticeable after putting her feet up to relax at night. She reports resolution of knee pain after total left knee replacement surgery at [facility] in [facility] 02/2019 with subsequent right knee replacement surgery 03/2019.       DEXA 04/2019 reveals persistent osteopenia.  She continues on weekly Fosamax and oral calcium.     She continues to require Ambien for sleep and typically takes 1/2 tablet nightly and denies any significant adverse side effects such as sleepwalking or excessive sedation or sleepwalking. Patient understands the risks associated with this controlled medication, including tolerance and  "addiction and has a signed provider agreement.       Repeat carotid Doppler 04/19/2019 revealed no evidence of significant stenosis.     Vitamin B-12 above goal at >2000 with her current daily oral vitamin B-12.      Weight is up 5 pounds in the past in the past ten months.  Blood pressure and heart rate at goal.     The patient's relevant past medical, surgical, and social history was reviewed in Epic.   Lab results are reviewed with the patient today.  CBC unremarkable.   Fasting glucose 104.  Total cholesterol 164.  HDL remains low at 38.  LDL 97.  Triglycerides 1.43.  LDL/HDL ratio 2.56.  Normal renal and liver function.  Normal thyroid function.      Review of Systems   Constitutional: Negative for chills, fatigue and fever.   HENT: Negative for congestion, ear pain, postnasal drip, sinus pressure and sore throat.    Respiratory: Negative for cough, shortness of breath and wheezing.    Cardiovascular: Negative for chest pain, palpitations and leg swelling.   Gastrointestinal: Negative for abdominal pain, blood in stool, constipation, diarrhea, nausea and vomiting.   Endocrine: Negative for cold intolerance, heat intolerance, polydipsia and polyuria.   Genitourinary: Negative for dysuria, frequency, hematuria and urgency.   Skin: Negative for rash.   Neurological: Negative for syncope and weakness.      Objective     Visit Vitals  /60   Pulse 60   Temp 97.9 °F (36.6 °C) (Oral)   Ht 162.6 cm (64\")   Wt 82 kg (180 lb 12.8 oz)   BMI 31.03 kg/m²     Physical Exam   Constitutional: She is oriented to person, place, and time. She appears well-developed and well-nourished. No distress.   Obese female.    HENT:   Head: Normocephalic and atraumatic.   Nose: Right sinus exhibits no maxillary sinus tenderness and no frontal sinus tenderness. Left sinus exhibits no maxillary sinus tenderness and no frontal sinus tenderness.   Mouth/Throat: Uvula is midline, oropharynx is clear and moist and mucous membranes are " normal. No oral lesions. No tonsillar exudate.   Eyes: Pupils are equal, round, and reactive to light. Conjunctivae and EOM are normal.   Neck: Trachea normal. Neck supple. No JVD present. Carotid bruit is not present. No tracheal deviation present. No thyroid mass and no thyromegaly present.   Cardiovascular: Normal rate, regular rhythm, normal heart sounds and intact distal pulses.  No extrasystoles are present. PMI is not displaced.   No murmur heard.  Pulmonary/Chest: Effort normal and breath sounds normal. No accessory muscle usage. No respiratory distress. She has no decreased breath sounds. She has no wheezes. She has no rhonchi. She has no rales.   Abdominal: Soft. Bowel sounds are normal. She exhibits no distension. There is no hepatosplenomegaly. There is no tenderness.   Obese abdomen limits exam.      Vascular Status -  Her right foot exhibits normal foot vasculature  and no edema. Her left foot exhibits normal foot vasculature  and no edema.  Lymphadenopathy:     She has no cervical adenopathy.   Neurological: She is alert and oriented to person, place, and time. No cranial nerve deficit. Coordination normal.   Skin: Skin is warm, dry and intact. No rash noted. No cyanosis. Nails show no clubbing.   Psychiatric: She has a normal mood and affect. Her speech is normal and behavior is normal. Judgment and thought content normal.   Vitals reviewed.      Assessment/Plan      Refill the Ambien. Patient understands the risks associated with this controlled medication, including tolerance and addiction.  she also agrees to only obtain this medication from me, and not from a another provider, unless that provider is covering for me in my absence.  she also agrees to be compliant in dosing, and not self adjust the dose of medication.  A signed controlled substance agreement is on file, and she has received a controlled substance education sheet at this a previous visit.  she has also signed a consent for  treatment with a controlled substance as per [facility] policy. WENDY was obtained.    Increase physical activity to raise HDL cholesterol.      Refill sent for Mobic 15 mg to take one q.d. With food for the lumbar pain.  Continue the Tramadol.       Continue with Fosamax 70 mg weekly and oral calcium.      For hemorrhoidal flares, I recommended cleaning rectal area with witch hazel wipes to help relieve burning and itching.  I sent a prescription for Anusol HC rectal cream to use as directed.   Recommended daily fiber therapy and stool softeners to manage constipation, which can flare the hemorrhoidal discomfort.      I asked her to check and confirm she is taking 500 mcg dose of vitamin B-12 daily.  If so, she should decrease vitamin B-12 by 50%.      Return in one year for annual exam with fasting labs one week prior.     Scribed for Dr. Abbasi by Kathryn Sweeney [facility].     Diagnoses and all orders for this visit:    Mixed hyperlipidemia  -     CBC Auto Differential; Future  -     Comprehensive Metabolic Panel; Future  -     Lipid Panel; Future  -     TSH; Future    Sleep disorder  -     zolpidem (AMBIEN) 10 MG tablet; Take 1 tablet by mouth At Night As Needed for Sleep. 1/2 to 1 by mouth every night as needed for sleep.    Palpitations  -     Comprehensive Metabolic Panel; Future    Migraine without aura and without status migrainosus, not intractable    Obstructive sleep apnea syndrome    Gastroesophageal reflux disease, esophagitis presence not specified    Class 1 obesity due to excess calories with serious comorbidity and body mass index (BMI) of 31.0 to 31.9 in adult    Disc disorder of lumbar region    Osteopenia of multiple sites  -     Vitamin D 25 Hydroxy; Future    Dysthymia    External hemorrhoids    Fatigue, unspecified type  -     Vitamin B12; Future    Other orders  -     meloxicam (MOBIC) 15 MG tablet; Take 1 tablet by mouth Daily As Needed (pain). Take with food  -     hydrocortisone  (ANUSOL-HC) 2.5 % rectal cream; Insert  into the rectum 2 (Two) Times a Day As Needed for Hemorrhoids.  -     topiramate (TOPAMAX) 50 MG tablet; Take 1 tablet by mouth Daily.        Lab on 03/24/2020   Component Date Value Ref Range Status   • Glucose 03/24/2020 104* 70 - 99 mg/dL Final   • BUN 03/24/2020 10  7 - 23 mg/dL Final   • Creatinine 03/24/2020 1.05* 0.52 - 1.04 mg/dL Final   • Sodium 03/24/2020 143  137 - 145 mmol/L Final   • Potassium 03/24/2020 3.7  3.4 - 5.0 mmol/L Final   • Chloride 03/24/2020 106  101 - 112 mmol/L Final   • CO2 03/24/2020 26.0  22.0 - 30.0 mmol/L Final   • Calcium 03/24/2020 9.3  8.4 - 10.2 mg/dL Final   • Total Protein 03/24/2020 7.3  6.3 - 8.6 g/dL Final   • Albumin 03/24/2020 4.00  3.50 - 5.00 g/dL Final   • ALT (SGPT) 03/24/2020 13  <=35 U/L Final   • AST (SGOT) 03/24/2020 21  14 - 36 U/L Final   • Alkaline Phosphatase 03/24/2020 55  38 - 126 U/L Final   • Total Bilirubin 03/24/2020 0.4  0.2 - 1.3 mg/dL Final   • eGFR Non African Amer 03/24/2020 53  45 - 104 mL/min/1.73 Final   • Globulin 03/24/2020 3.3  2.3 - 3.5 gm/dL Final   • A/G Ratio 03/24/2020 1.2  1.1 - 1.8 g/dL Final   • BUN/Creatinine Ratio 03/24/2020 9.5  7.0 - 25.0 Final   • Anion Gap 03/24/2020 11.0  5.0 - 15.0 mmol/L Final   • Total Cholesterol 03/24/2020 164  150 - 200 mg/dL Final   • Triglycerides 03/24/2020 143  <=150 mg/dL Final   • HDL Cholesterol 03/24/2020 38* 40 - 59 mg/dL Final   • LDL Cholesterol  03/24/2020 97  <=100 mg/dL Final   • VLDL Cholesterol 03/24/2020 28.6  mg/dL Final   • LDL/HDL Ratio 03/24/2020 2.56  0.00 - 3.22 Final   • Vitamin B-12 03/24/2020 >2,000* 211 - 946 pg/mL Final   • 25 Hydroxy, Vitamin D 03/24/2020 35.0  30.0 - 100.0 ng/ml Final   • TSH 03/24/2020 3.900  0.270 - 4.200 uIU/mL Final   • WBC 03/24/2020 7.31  3.40 - 10.80 10*3/mm3 Final   • RBC 03/24/2020 4.94  3.77 - 5.28 10*6/mm3 Final   • Hemoglobin 03/24/2020 13.9  12.0 - 15.9 g/dL Final   • Hematocrit 03/24/2020 41.6  34.0 - 46.6  % Final   • MCV 03/24/2020 84.2  79.0 - 97.0 fL Final   • MCH 03/24/2020 28.1  26.6 - 33.0 pg Final   • MCHC 03/24/2020 33.4  31.5 - 35.7 g/dL Final   • RDW 03/24/2020 14.4  12.3 - 15.4 % Final   • RDW-SD 03/24/2020 43.2  37.0 - 54.0 fl Final   • MPV 03/24/2020 9.8  6.0 - 12.0 fL Final   • Platelets 03/24/2020 292  140 - 450 10*3/mm3 Final   • Neutrophil % 03/24/2020 45.0  42.7 - 76.0 % Final   • Lymphocyte % 03/24/2020 40.8  19.6 - 45.3 % Final   • Monocyte % 03/24/2020 6.2  5.0 - 12.0 % Final   • Eosinophil % 03/24/2020 7.3* 0.3 - 6.2 % Final   • Basophil % 03/24/2020 0.7  0.0 - 1.5 % Final   • Neutrophils, Absolute 03/24/2020 3.30  1.70 - 7.00 10*3/mm3 Final   • Lymphocytes, Absolute 03/24/2020 2.98  0.70 - 3.10 10*3/mm3 Final   • Monocytes, Absolute 03/24/2020 0.45  0.10 - 0.90 10*3/mm3 Final   • Eosinophils, Absolute 03/24/2020 0.53* 0.00 - 0.40 10*3/mm3 Final   • Basophils, Absolute 03/24/2020 0.05  0.00 - 0.20 10*3/mm3 Final   ]

## 2020-03-31 NOTE — PATIENT INSTRUCTIONS
Exercising to Lose Weight  Exercise is structured, repetitive physical activity to improve fitness and health. Getting regular exercise is important for everyone. It is especially important if you are overweight. Being overweight increases your risk of heart disease, stroke, diabetes, high blood pressure, and several types of cancer. Reducing your calorie intake and exercising can help you lose weight.  Exercise is usually categorized as moderate or vigorous intensity. To lose weight, most people need to do a certain amount of moderate-intensity or vigorous-intensity exercise each week.  Moderate-intensity exercise    Moderate-intensity exercise is any activity that gets you moving enough to burn at least three times more energy (calories) than if you were sitting.  Examples of moderate exercise include:  · Walking a mile in 15 minutes.  · Doing light yard work.  · Biking at an easy pace.  Most people should get at least 150 minutes (2 hours and 30 minutes) a week of moderate-intensity exercise to maintain their body weight.  Vigorous-intensity exercise  Vigorous-intensity exercise is any activity that gets you moving enough to burn at least six times more calories than if you were sitting. When you exercise at this intensity, you should be working hard enough that you are not able to carry on a conversation.  Examples of vigorous exercise include:  · Running.  · Playing a team sport, such as football, basketball, and soccer.  · Jumping rope.  Most people should get at least 75 minutes (1 hour and 15 minutes) a week of vigorous-intensity exercise to maintain their body weight.  How can exercise affect me?  When you exercise enough to burn more calories than you eat, you lose weight. Exercise also reduces body fat and builds muscle. The more muscle you have, the more calories you burn. Exercise also:  · Improves mood.  · Reduces stress and tension.  · Improves your overall fitness, flexibility, and  endurance.  · Increases bone strength.  The amount of exercise you need to lose weight depends on:  · Your age.  · The type of exercise.  · Any health conditions you have.  · Your overall physical ability.  Talk to your health care provider about how much exercise you need and what types of activities are safe for you.  What actions can I take to lose weight?  Nutrition    · Make changes to your diet as told by your health care provider or diet and nutrition specialist (dietitian). This may include:  ? Eating fewer calories.  ? Eating more protein.  ? Eating less unhealthy fats.  ? Eating a diet that includes fresh fruits and vegetables, whole grains, low-fat dairy products, and lean protein.  ? Avoiding foods with added fat, salt, and sugar.  · Drink plenty of water while you exercise to prevent dehydration or heat stroke.  Activity  · Choose an activity that you enjoy and set realistic goals. Your health care provider can help you make an exercise plan that works for you.  · Exercise at a moderate or vigorous intensity most days of the week.  ? The intensity of exercise may vary from person to person. You can tell how intense a workout is for you by paying attention to your breathing and heartbeat. Most people will notice their breathing and heartbeat get faster with more intense exercise.  · Do resistance training twice each week, such as:  ? Push-ups.  ? Sit-ups.  ? Lifting weights.  ? Using resistance bands.  · Getting short amounts of exercise can be just as helpful as long structured periods of exercise. If you have trouble finding time to exercise, try to include exercise in your daily routine.  ? Get up, stretch, and walk around every 30 minutes throughout the day.  ? Go for a walk during your lunch break.  ? Park your car farther away from your destination.  ? If you take public transportation, get off one stop early and walk the rest of the way.  ? Make phone calls while standing up and walking  around.  ? Take the stairs instead of elevators or escalators.  · Wear comfortable clothes and shoes with good support.  · Do not exercise so much that you hurt yourself, feel dizzy, or get very short of breath.  Where to find more information  · [place] Department of Health and Human Services: www.hhs.gov  · Centers for Disease Control and Prevention (CDC): www.cdc.gov  Contact a health care provider:  · Before starting a new exercise program.  · If you have questions or concerns about your weight.  · If you have a medical problem that keeps you from exercising.  Get help right away if you have any of the following while exercising:  · Injury.  · Dizziness.  · Difficulty breathing or shortness of breath that does not go away when you stop exercising.  · Chest pain.  · Rapid heartbeat.  Summary  · Being overweight increases your risk of heart disease, stroke, diabetes, high blood pressure, and several types of cancer.  · Losing weight happens when you burn more calories than you eat.  · Reducing the amount of calories you eat in addition to getting regular moderate or vigorous exercise each week helps you lose weight.  This information is not intended to replace advice given to you by your health care provider. Make sure you discuss any questions you have with your health care provider.  Document Released: 01/20/2012 Document Revised: 12/31/2018 Document Reviewed: 12/31/2018  [employer] Patient Education © 2020 [employer]      Calorie Counting for Weight Loss  Calories are units of energy. Your body needs a certain amount of calories from food to keep you going throughout the day. When you eat more calories than your body needs, your body stores the extra calories as fat. When you eat fewer calories than your body needs, your body burns fat to get the energy it needs.  Calorie counting means keeping track of how many calories you eat and drink each day. Calorie counting can be helpful if you need to lose  weight. If you make sure to eat fewer calories than your body needs, you should lose weight. Ask your health care provider what a healthy weight is for you.  For calorie counting to work, you will need to eat the right number of calories in a day in order to lose a healthy amount of weight per week. A dietitian can help you determine how many calories you need in a day and will give you suggestions on how to reach your calorie goal.  · A healthy amount of weight to lose per week is usually 1-2 lb (0.5-0.9 kg). This usually means that your daily calorie intake should be reduced by 500-750 calories.  · Eating 1,200 - 1,500 calories per day can help most women lose weight.  · Eating 1,500 - 1,800 calories per day can help most men lose weight.  What is my plan?  My goal is to have __________ calories per day.  If I have this many calories per day, I should lose around __________ pounds per week.  What do I need to know about calorie counting?  In order to meet your daily calorie goal, you will need to:  · Find out how many calories are in each food you would like to eat. Try to do this before you eat.  · Decide how much of the food you plan to eat.  · Write down what you ate and how many calories it had. Doing this is called keeping a food log.  To successfully lose weight, it is important to balance calorie counting with a healthy lifestyle that includes regular activity. Aim for 150 minutes of moderate exercise (such as walking) or 75 minutes of vigorous exercise (such as running) each week.  Where do I find calorie information?    The number of calories in a food can be found on a Nutrition Facts label. If a food does not have a Nutrition Facts label, try to look up the calories online or ask your dietitian for help.  Remember that calories are listed per serving. If you choose to have more than one serving of a food, you will have to multiply the calories per serving by the amount of servings you plan to eat. For  "example, the label on a package of bread might say that a serving size is 1 slice and that there are 90 calories in a serving. If you eat 1 slice, you will have eaten 90 calories. If you eat 2 slices, you will have eaten 180 calories.  How do I keep a food log?  Immediately after each meal, record the following information in your food log:  · What you ate. Don't forget to include toppings, sauces, and other extras on the food.  · How much you ate. This can be measured in cups, ounces, or number of items.  · How many calories each food and drink had.  · The total number of calories in the meal.  Keep your food log near you, such as in a small notebook in your pocket, or use a mobile jamila or website. Some programs will calculate calories for you and show you how many calories you have left for the day to meet your goal.  What are some calorie counting tips?    · Use your calories on foods and drinks that will fill you up and not leave you hungry:  ? Some examples of foods that fill you up are nuts and nut butters, vegetables, lean proteins, and high-fiber foods like whole grains. High-fiber foods are foods with more than 5 g fiber per serving.  ? Drinks such as sodas, specialty coffee drinks, alcohol, and juices have a lot of calories, yet do not fill you up.  · Eat nutritious foods and avoid empty calories. Empty calories are calories you get from foods or beverages that do not have many vitamins or protein, such as candy, sweets, and soda. It is better to have a nutritious high-calorie food (such as an avocado) than a food with few nutrients (such as a bag of chips).  · Know how many calories are in the foods you eat most often. This will help you calculate calorie counts faster.  · Pay attention to calories in drinks. Low-calorie drinks include water and unsweetened drinks.  · Pay attention to nutrition labels for \"low fat\" or \"fat free\" foods. These foods sometimes have the same amount of calories or more calories " than the full fat versions. They also often have added sugar, starch, or salt, to make up for flavor that was removed with the fat.  · Find a way of tracking calories that works for you. Get creative. Try different apps or programs if writing down calories does not work for you.  What are some portion control tips?  · Know how many calories are in a serving. This will help you know how many servings of a certain food you can have.  · Use a measuring cup to measure serving sizes. You could also try weighing out portions on a kitchen scale. With time, you will be able to estimate serving sizes for some foods.  · Take some time to put servings of different foods on your favorite plates, bowls, and cups so you know what a serving looks like.  · Try not to eat straight from a bag or box. Doing this can lead to overeating. Put the amount you would like to eat in a cup or on a plate to make sure you are eating the right portion.  · Use smaller plates, glasses, and bowls to prevent overeating.  · Try not to multitask (for example, watch TV or use your computer) while eating. If it is time to eat, sit down at a table and enjoy your food. This will help you to know when you are full. It will also help you to be aware of what you are eating and how much you are eating.  What are tips for following this plan?  Reading food labels  · Check the calorie count compared to the serving size. The serving size may be smaller than what you are used to eating.  · Check the source of the calories. Make sure the food you are eating is high in vitamins and protein and low in saturated and trans fats.  Shopping  · Read nutrition labels while you shop. This will help you make healthy decisions before you decide to purchase your food.  · Make a grocery list and stick to it.  Cooking  · Try to cook your favorite foods in a healthier way. For example, try baking instead of frying.  · Use low-fat dairy products.  Meal planning  · Use more fruits  "and vegetables. Half of your plate should be fruits and vegetables.  · Include lean proteins like poultry and fish.  How do I count calories when eating out?  · Ask for smaller portion sizes.  · Consider sharing an entree and sides instead of getting your own entree.  · If you get your own entree, eat only half. Ask for a box at the beginning of your meal and put the rest of your entree in it so you are not tempted to eat it.  · If calories are listed on the menu, choose the lower calorie options.  · Choose dishes that include vegetables, fruits, whole grains, low-fat dairy products, and lean protein.  · Choose items that are boiled, broiled, grilled, or steamed. Stay away from items that are buttered, battered, fried, or served with cream sauce. Items labeled \"crispy\" are usually fried, unless stated otherwise.  · Choose water, low-fat milk, unsweetened iced tea, or other drinks without added sugar. If you want an alcoholic beverage, choose a lower calorie option such as a glass of wine or light beer.  · Ask for dressings, sauces, and syrups on the side. These are usually high in calories, so you should limit the amount you eat.  · If you want a salad, choose a garden salad and ask for grilled meats. Avoid extra toppings like lion, cheese, or fried items. Ask for the dressing on the side, or ask for olive oil and vinegar or lemon to use as dressing.  · Estimate how many servings of a food you are given. For example, a serving of cooked rice is ½ cup or about the size of half a baseball. Knowing serving sizes will help you be aware of how much food you are eating at restaurants. The list below tells you how big or small some common portion sizes are based on everyday objects:  ? 1 oz--4 stacked dice.  ? 3 oz--1 deck of cards.  ? 1 tsp--1 die.  ? 1 Tbsp--½ a ping-pong ball.  ? 2 Tbsp--1 ping-pong ball.  ? ½ cup--½ baseball.  ? 1 cup--1 baseball.  Summary  · Calorie counting means keeping track of how many calories " you eat and drink each day. If you eat fewer calories than your body needs, you should lose weight.  · A healthy amount of weight to lose per week is usually 1-2 lb (0.5-0.9 kg). This usually means reducing your daily calorie intake by 500-750 calories.  · The number of calories in a food can be found on a Nutrition Facts label. If a food does not have a Nutrition Facts label, try to look up the calories online or ask your dietitian for help.  · Use your calories on foods and drinks that will fill you up, and not on foods and drinks that will leave you hungry.  · Use smaller plates, glasses, and bowls to prevent overeating.  This information is not intended to replace advice given to you by your health care provider. Make sure you discuss any questions you have with your health care provider.  Document Released: 12/18/2006 Document Revised: 09/06/2019 Document Reviewed: 11/17/2017  [employer] Interactive Patient Education © 2020 [employer]

## 2020-06-04 RX ORDER — DIGOXIN 125 MCG
125 TABLET ORAL
Qty: 90 TABLET | Refills: 3 | Status: SHIPPED | OUTPATIENT
Start: 2020-06-04

## 2020-06-12 ENCOUNTER — TELEPHONE (OUTPATIENT)
Dept: FAMILY MEDICINE CLINIC | Facility: CLINIC | Age: 60
End: 2020-06-12

## 2020-06-12 DIAGNOSIS — R05.9 COUGH: ICD-10-CM

## 2020-06-12 DIAGNOSIS — R50.9 FEVER, UNSPECIFIED FEVER CAUSE: Primary | ICD-10-CM

## 2020-06-12 NOTE — TELEPHONE ENCOUNTER
Patient called and requested to be tested for Covid. She has fever, cough, SOB and muscle pain. I sent request to [facility]. If she gets worse she will go to ER. TP

## 2023-06-22 NOTE — PROGRESS NOTES
Pt called complaining of yeast infection symptoms. Will treat with Diflucan 150mg x 2 doses, 72 hours apart. Must present to clinic if symptoms persist.   
1.5

## 2024-07-12 NOTE — PROGRESS NOTES
"Subjective        History of Present Illness     Alexa Luna is a 59 y.o. female who comes in for ER follow up.  She was seen in the ER at [facility] on 09/07/2019 after experiencing acute a sharp, colicky pain in the left flank that radiating to the LLQ associated with nausea and vomiting.  UA notable for calcium oxalate crystals.  CT scan revealed nonobstructing right renal calculi, although, pain is occurring on the left.  Given patient's left flank/LLQ colicky pain and UA results, patient was diagnosed with renal/ureteral stone and discharged with Norco 5 mg #10 and Zofran.  She took a Norco about 2:00 a.m. this morning with partial relief, as she continues to experience the pain.  She has not required the Zofran. This was her first episode of renal colic.  She has a history of sciatica and is reporting a sensation of left lower extremity numbness and burning.  Denies decreased strength.       She was seen in the ER 07/29/2019 for abdominal pain, which has resolved.       Objective     Vitals:    09/09/19 1042   BP: 100/60   Pulse: 64   Temp: 97.9 °F (36.6 °C)   TempSrc: Oral   Weight: 77.6 kg (171 lb)   Height: 162.6 cm (64\")     Physical Exam   Constitutional: She is oriented to person, place, and time. She appears well-developed and well-nourished. No distress.   HENT:   Head: Normocephalic and atraumatic.   Nose: Nose normal.   Mouth/Throat: Oropharynx is clear and moist. No oropharyngeal exudate.   Eyes: EOM are normal. Pupils are equal, round, and reactive to light.   Neck: Neck supple. No JVD present. No thyromegaly present.   Cardiovascular: Normal rate, regular rhythm and normal heart sounds.   Pulmonary/Chest: Effort normal and breath sounds normal. No accessory muscle usage. No respiratory distress. She has no wheezes. She has no rales.   Abdominal: Soft. Bowel sounds are normal. She exhibits no distension. There is no tenderness.   Left flank and CVA tenderness.  Left-sided abdominal and flank " See wound care assessment documentation in chart review. Scanned under media tab.        tenderness.     Musculoskeletal: She exhibits no edema.   Straight leg raise on the left is negative.  Gait is normal.   Lymphadenopathy:     She has no cervical adenopathy.   Neurological: She is alert and oriented to person, place, and time. No cranial nerve deficit.   Psychiatric: She has a normal mood and affect. Her speech is normal and behavior is normal. Judgment and thought content normal.     Future Appointments   Date Time Provider Department Center   3/17/2020  3:30 PM Scot Abbasi MD [facility] None       Assessment/Plan      For the renal colic, a prescription is sent for Flomax 0.4 mg to take one q.d.   Increase water intake.  Stay well hydrated.   She can take an additional dose of the Norco 5 mg if needed.  She was only given #12 in the ER.  A prescription is given for increased dose of Norco 7.5/325 mg to take 1/2-1 po q.i.d. p.r.n. pain (#20 given).        Her left lower extremity numbness appear to be related to a flare of sciatica.  We will evaluate further if symptoms do not resolve.  The pain medication prescribed should also cover this issue.     Return in March for routine follow up or sooner if needed.     Scribed for Dr. Abbasi by Kathryn Sweeney [facility].     Diagnoses and all orders for this visit:    Renal colic on left side    Sciatica of left side    Other orders  -     Discontinue: HYDROcodone-acetaminophen (NORCO) 5-325 MG per tablet; Take 1 tablet by mouth Every 6 (Six) Hours As Needed.  -     ondansetron ODT (ZOFRAN-ODT) 4 MG disintegrating tablet; Take 4 mg by mouth.  -     HYDROcodone-acetaminophen (NORCO) 7.5-325 MG per tablet; 1/2-1 po QID prn pain.  Use sparingly  -     tamsulosin (FLOMAX) 0.4 MG capsule 24 hr capsule; Take 1 capsule by mouth Daily.        No visits with results within 3 Week(s) from this visit.   Latest known visit with results is:   Lab on 07/23/2019   Component Date Value Ref Range Status   • TSH 07/23/2019 1.430  0.270 - 4.200 mIU/mL Final   ]